# Patient Record
Sex: FEMALE | Race: WHITE | NOT HISPANIC OR LATINO | Employment: OTHER | URBAN - METROPOLITAN AREA
[De-identification: names, ages, dates, MRNs, and addresses within clinical notes are randomized per-mention and may not be internally consistent; named-entity substitution may affect disease eponyms.]

---

## 2018-05-01 ENCOUNTER — HOSPITAL ENCOUNTER (OUTPATIENT)
Facility: OTHER | Age: 28
LOS: 1 days | Discharge: HOME | End: 2018-05-02
Attending: EMERGENCY MEDICINE | Admitting: EMERGENCY MEDICINE
Payer: COMMERCIAL

## 2018-05-01 DIAGNOSIS — K35.80 ACUTE APPENDICITIS: ICD-10-CM

## 2018-05-01 LAB
ALBUMIN SERPL BCP-MCNC: 4.1 G/DL
ALP SERPL-CCNC: 36 U/L
ALT SERPL W/O P-5'-P-CCNC: 15 U/L
AMPHET+METHAMPHET UR QL: NEGATIVE
ANION GAP SERPL CALC-SCNC: 12 MMOL/L
AST SERPL-CCNC: 15 U/L
B-HCG UR QL: NEGATIVE
BARBITURATES UR QL SCN>200 NG/ML: NEGATIVE
BASOPHILS # BLD AUTO: 0.02 K/UL
BASOPHILS NFR BLD: 0.1 %
BENZODIAZ UR QL SCN>200 NG/ML: NEGATIVE
BILIRUB SERPL-MCNC: 0.7 MG/DL
BILIRUB UR QL STRIP: NEGATIVE
BUN SERPL-MCNC: 12 MG/DL
BZE UR QL SCN: NEGATIVE
CALCIUM SERPL-MCNC: 9.4 MG/DL
CANNABINOIDS UR QL SCN: NORMAL
CHLORIDE SERPL-SCNC: 104 MMOL/L
CLARITY UR: CLEAR
CO2 SERPL-SCNC: 22 MMOL/L
COLOR UR: YELLOW
CREAT SERPL-MCNC: 0.8 MG/DL
CREAT UR-MCNC: 104.2 MG/DL
CRP SERPL-MCNC: 12.2 MG/L
CTP QC/QA: YES
DIFFERENTIAL METHOD: ABNORMAL
EOSINOPHIL # BLD AUTO: 0 K/UL
EOSINOPHIL NFR BLD: 0.2 %
ERYTHROCYTE [DISTWIDTH] IN BLOOD BY AUTOMATED COUNT: 12.1 %
EST. GFR  (AFRICAN AMERICAN): >60 ML/MIN/1.73 M^2
EST. GFR  (NON AFRICAN AMERICAN): >60 ML/MIN/1.73 M^2
GLUCOSE SERPL-MCNC: 105 MG/DL
GLUCOSE UR QL STRIP: NEGATIVE
HCT VFR BLD AUTO: 40.7 %
HGB BLD-MCNC: 14.1 G/DL
HGB UR QL STRIP: NEGATIVE
INR PPP: 0.9
KETONES UR QL STRIP: ABNORMAL
LEUKOCYTE ESTERASE UR QL STRIP: NEGATIVE
LIPASE SERPL-CCNC: 17 U/L
LYMPHOCYTES # BLD AUTO: 1.5 K/UL
LYMPHOCYTES NFR BLD: 8.6 %
MCH RBC QN AUTO: 31.8 PG
MCHC RBC AUTO-ENTMCNC: 34.6 G/DL
MCV RBC AUTO: 92 FL
METHADONE UR QL SCN>300 NG/ML: NEGATIVE
MONOCYTES # BLD AUTO: 1.3 K/UL
MONOCYTES NFR BLD: 7.8 %
NEUTROPHILS # BLD AUTO: 14 K/UL
NEUTROPHILS NFR BLD: 83.1 %
NITRITE UR QL STRIP: NEGATIVE
OPIATES UR QL SCN: NEGATIVE
PCP UR QL SCN>25 NG/ML: NEGATIVE
PH UR STRIP: 5 [PH] (ref 5–8)
PLATELET # BLD AUTO: 206 K/UL
PMV BLD AUTO: 9.5 FL
POTASSIUM SERPL-SCNC: 3.5 MMOL/L
PROT SERPL-MCNC: 6.7 G/DL
PROT UR QL STRIP: NEGATIVE
PROTHROMBIN TIME: 10.3 SEC
RBC # BLD AUTO: 4.44 M/UL
SODIUM SERPL-SCNC: 138 MMOL/L
SP GR UR STRIP: 1.02 (ref 1–1.03)
TOXICOLOGY INFORMATION: NORMAL
URN SPEC COLLECT METH UR: ABNORMAL
UROBILINOGEN UR STRIP-ACNC: NEGATIVE EU/DL
WBC # BLD AUTO: 16.91 K/UL

## 2018-05-01 PROCEDURE — 25000003 PHARM REV CODE 250: Performed by: EMERGENCY MEDICINE

## 2018-05-01 PROCEDURE — 96361 HYDRATE IV INFUSION ADD-ON: CPT

## 2018-05-01 PROCEDURE — 96374 THER/PROPH/DIAG INJ IV PUSH: CPT

## 2018-05-01 PROCEDURE — 81025 URINE PREGNANCY TEST: CPT | Performed by: EMERGENCY MEDICINE

## 2018-05-01 PROCEDURE — 85025 COMPLETE CBC W/AUTO DIFF WBC: CPT

## 2018-05-01 PROCEDURE — 80307 DRUG TEST PRSMV CHEM ANLYZR: CPT

## 2018-05-01 PROCEDURE — 81003 URINALYSIS AUTO W/O SCOPE: CPT

## 2018-05-01 PROCEDURE — 85610 PROTHROMBIN TIME: CPT

## 2018-05-01 PROCEDURE — 80053 COMPREHEN METABOLIC PANEL: CPT

## 2018-05-01 PROCEDURE — 63600175 PHARM REV CODE 636 W HCPCS: Performed by: EMERGENCY MEDICINE

## 2018-05-01 PROCEDURE — 99285 EMERGENCY DEPT VISIT HI MDM: CPT | Mod: 25

## 2018-05-01 PROCEDURE — 86140 C-REACTIVE PROTEIN: CPT

## 2018-05-01 PROCEDURE — 83690 ASSAY OF LIPASE: CPT

## 2018-05-01 PROCEDURE — 96375 TX/PRO/DX INJ NEW DRUG ADDON: CPT

## 2018-05-01 PROCEDURE — 85651 RBC SED RATE NONAUTOMATED: CPT

## 2018-05-01 PROCEDURE — 86850 RBC ANTIBODY SCREEN: CPT

## 2018-05-01 RX ORDER — ONDANSETRON 2 MG/ML
4 INJECTION INTRAMUSCULAR; INTRAVENOUS
Status: COMPLETED | OUTPATIENT
Start: 2018-05-01 | End: 2018-05-01

## 2018-05-01 RX ORDER — MORPHINE SULFATE 2 MG/ML
6 INJECTION, SOLUTION INTRAMUSCULAR; INTRAVENOUS
Status: COMPLETED | OUTPATIENT
Start: 2018-05-01 | End: 2018-05-01

## 2018-05-01 RX ADMIN — MORPHINE SULFATE 6 MG: 2 INJECTION, SOLUTION INTRAMUSCULAR; INTRAVENOUS at 11:05

## 2018-05-01 RX ADMIN — ONDANSETRON 4 MG: 2 INJECTION INTRAMUSCULAR; INTRAVENOUS at 11:05

## 2018-05-01 RX ADMIN — SODIUM CHLORIDE 1000 ML: 0.9 INJECTION, SOLUTION INTRAVENOUS at 11:05

## 2018-05-02 ENCOUNTER — ANESTHESIA (OUTPATIENT)
Dept: SURGERY | Facility: OTHER | Age: 28
End: 2018-05-02
Payer: COMMERCIAL

## 2018-05-02 ENCOUNTER — ANESTHESIA EVENT (OUTPATIENT)
Dept: SURGERY | Facility: OTHER | Age: 28
End: 2018-05-02
Payer: COMMERCIAL

## 2018-05-02 VITALS
HEIGHT: 65 IN | HEART RATE: 58 BPM | OXYGEN SATURATION: 98 % | TEMPERATURE: 99 F | BODY MASS INDEX: 24.53 KG/M2 | WEIGHT: 147.25 LBS | DIASTOLIC BLOOD PRESSURE: 76 MMHG | RESPIRATION RATE: 16 BRPM | SYSTOLIC BLOOD PRESSURE: 139 MMHG

## 2018-05-02 PROBLEM — K35.80 ACUTE APPENDICITIS: Status: ACTIVE | Noted: 2018-05-02

## 2018-05-02 LAB
ABO + RH BLD: NORMAL
BLD GP AB SCN CELLS X3 SERPL QL: NORMAL
ERYTHROCYTE [SEDIMENTATION RATE] IN BLOOD BY WESTERGREN METHOD: 0 MM/HR

## 2018-05-02 PROCEDURE — 25500020 PHARM REV CODE 255: Performed by: EMERGENCY MEDICINE

## 2018-05-02 PROCEDURE — 63600175 PHARM REV CODE 636 W HCPCS: Performed by: SPECIALIST

## 2018-05-02 PROCEDURE — 25000003 PHARM REV CODE 250: Performed by: SPECIALIST

## 2018-05-02 PROCEDURE — 25000003 PHARM REV CODE 250: Performed by: EMERGENCY MEDICINE

## 2018-05-02 PROCEDURE — 63600175 PHARM REV CODE 636 W HCPCS: Performed by: NURSE ANESTHETIST, CERTIFIED REGISTERED

## 2018-05-02 PROCEDURE — 87070 CULTURE OTHR SPECIMN AEROBIC: CPT

## 2018-05-02 PROCEDURE — 36000708 HC OR TIME LEV III 1ST 15 MIN: Performed by: SPECIALIST

## 2018-05-02 PROCEDURE — 94761 N-INVAS EAR/PLS OXIMETRY MLT: CPT

## 2018-05-02 PROCEDURE — 88304 TISSUE EXAM BY PATHOLOGIST: CPT | Performed by: PATHOLOGY

## 2018-05-02 PROCEDURE — 37000008 HC ANESTHESIA 1ST 15 MINUTES: Performed by: SPECIALIST

## 2018-05-02 PROCEDURE — G0378 HOSPITAL OBSERVATION PER HR: HCPCS

## 2018-05-02 PROCEDURE — 63600175 PHARM REV CODE 636 W HCPCS: Performed by: EMERGENCY MEDICINE

## 2018-05-02 PROCEDURE — 88304 TISSUE EXAM BY PATHOLOGIST: CPT | Mod: 26,,, | Performed by: PATHOLOGY

## 2018-05-02 PROCEDURE — 87075 CULTR BACTERIA EXCEPT BLOOD: CPT

## 2018-05-02 PROCEDURE — 25000003 PHARM REV CODE 250: Performed by: NURSE ANESTHETIST, CERTIFIED REGISTERED

## 2018-05-02 PROCEDURE — 71000033 HC RECOVERY, INTIAL HOUR: Performed by: SPECIALIST

## 2018-05-02 PROCEDURE — 36000709 HC OR TIME LEV III EA ADD 15 MIN: Performed by: SPECIALIST

## 2018-05-02 PROCEDURE — 37000009 HC ANESTHESIA EA ADD 15 MINS: Performed by: SPECIALIST

## 2018-05-02 PROCEDURE — 27201423 OPTIME MED/SURG SUP & DEVICES STERILE SUPPLY: Performed by: SPECIALIST

## 2018-05-02 RX ORDER — SODIUM CHLORIDE 9 MG/ML
INJECTION, SOLUTION INTRAVENOUS CONTINUOUS
Status: DISCONTINUED | OUTPATIENT
Start: 2018-05-02 | End: 2018-05-02 | Stop reason: HOSPADM

## 2018-05-02 RX ORDER — FENTANYL CITRATE 50 UG/ML
25 INJECTION, SOLUTION INTRAMUSCULAR; INTRAVENOUS EVERY 5 MIN PRN
Status: COMPLETED | OUTPATIENT
Start: 2018-05-02 | End: 2018-05-02

## 2018-05-02 RX ORDER — SUCCINYLCHOLINE CHLORIDE 20 MG/ML
INJECTION INTRAMUSCULAR; INTRAVENOUS
Status: DISCONTINUED | OUTPATIENT
Start: 2018-05-02 | End: 2018-05-02

## 2018-05-02 RX ORDER — SODIUM CHLORIDE 0.9 % (FLUSH) 0.9 %
3 SYRINGE (ML) INJECTION
Status: DISCONTINUED | OUTPATIENT
Start: 2018-05-02 | End: 2018-05-02 | Stop reason: HOSPADM

## 2018-05-02 RX ORDER — MEPERIDINE HYDROCHLORIDE 50 MG/ML
12.5 INJECTION INTRAMUSCULAR; INTRAVENOUS; SUBCUTANEOUS ONCE AS NEEDED
Status: DISCONTINUED | OUTPATIENT
Start: 2018-05-02 | End: 2018-05-02 | Stop reason: HOSPADM

## 2018-05-02 RX ORDER — CEFOXITIN SODIUM 2 G/50ML
2 INJECTION, SOLUTION INTRAVENOUS
Status: COMPLETED | OUTPATIENT
Start: 2018-05-02 | End: 2018-05-02

## 2018-05-02 RX ORDER — ACETAMINOPHEN 10 MG/ML
INJECTION, SOLUTION INTRAVENOUS
Status: DISCONTINUED | OUTPATIENT
Start: 2018-05-02 | End: 2018-05-02

## 2018-05-02 RX ORDER — OXYCODONE HYDROCHLORIDE 5 MG/1
5 TABLET ORAL
Status: DISCONTINUED | OUTPATIENT
Start: 2018-05-02 | End: 2018-05-02 | Stop reason: HOSPADM

## 2018-05-02 RX ORDER — HYDROMORPHONE HYDROCHLORIDE 2 MG/ML
INJECTION, SOLUTION INTRAMUSCULAR; INTRAVENOUS; SUBCUTANEOUS
Status: DISCONTINUED | OUTPATIENT
Start: 2018-05-02 | End: 2018-05-02

## 2018-05-02 RX ORDER — ONDANSETRON 2 MG/ML
4 INJECTION INTRAMUSCULAR; INTRAVENOUS EVERY 12 HOURS PRN
Status: DISCONTINUED | OUTPATIENT
Start: 2018-05-02 | End: 2018-05-02 | Stop reason: HOSPADM

## 2018-05-02 RX ORDER — ROCURONIUM BROMIDE 10 MG/ML
INJECTION, SOLUTION INTRAVENOUS
Status: DISCONTINUED | OUTPATIENT
Start: 2018-05-02 | End: 2018-05-02

## 2018-05-02 RX ORDER — FENTANYL CITRATE 50 UG/ML
INJECTION, SOLUTION INTRAMUSCULAR; INTRAVENOUS
Status: DISCONTINUED | OUTPATIENT
Start: 2018-05-02 | End: 2018-05-02

## 2018-05-02 RX ORDER — LEVONORGESTREL AND ETHINYL ESTRADIOL 0.15-0.03
1 KIT ORAL DAILY
COMMUNITY
End: 2019-05-08 | Stop reason: SDUPTHER

## 2018-05-02 RX ORDER — GLYCOPYRROLATE 0.2 MG/ML
INJECTION INTRAMUSCULAR; INTRAVENOUS
Status: DISCONTINUED | OUTPATIENT
Start: 2018-05-02 | End: 2018-05-02

## 2018-05-02 RX ORDER — DIPHENHYDRAMINE HYDROCHLORIDE 50 MG/ML
25 INJECTION INTRAMUSCULAR; INTRAVENOUS EVERY 6 HOURS PRN
Status: DISCONTINUED | OUTPATIENT
Start: 2018-05-02 | End: 2018-05-02 | Stop reason: HOSPADM

## 2018-05-02 RX ORDER — HYDROCODONE BITARTRATE AND ACETAMINOPHEN 5; 325 MG/1; MG/1
1 TABLET ORAL EVERY 4 HOURS PRN
Status: DISCONTINUED | OUTPATIENT
Start: 2018-05-02 | End: 2018-05-02 | Stop reason: HOSPADM

## 2018-05-02 RX ORDER — PROPOFOL 10 MG/ML
VIAL (ML) INTRAVENOUS
Status: DISCONTINUED | OUTPATIENT
Start: 2018-05-02 | End: 2018-05-02

## 2018-05-02 RX ORDER — ONDANSETRON 2 MG/ML
4 INJECTION INTRAMUSCULAR; INTRAVENOUS EVERY 8 HOURS PRN
Status: DISCONTINUED | OUTPATIENT
Start: 2018-05-02 | End: 2018-05-02 | Stop reason: SDUPTHER

## 2018-05-02 RX ORDER — SODIUM CHLORIDE, SODIUM LACTATE, POTASSIUM CHLORIDE, CALCIUM CHLORIDE 600; 310; 30; 20 MG/100ML; MG/100ML; MG/100ML; MG/100ML
INJECTION, SOLUTION INTRAVENOUS CONTINUOUS PRN
Status: DISCONTINUED | OUTPATIENT
Start: 2018-05-02 | End: 2018-05-02

## 2018-05-02 RX ORDER — NEOSTIGMINE METHYLSULFATE 1 MG/ML
INJECTION, SOLUTION INTRAVENOUS
Status: DISCONTINUED | OUTPATIENT
Start: 2018-05-02 | End: 2018-05-02

## 2018-05-02 RX ORDER — DEXTROSE MONOHYDRATE, SODIUM CHLORIDE, AND POTASSIUM CHLORIDE 50; 1.49; 4.5 G/1000ML; G/1000ML; G/1000ML
INJECTION, SOLUTION INTRAVENOUS CONTINUOUS
Status: DISCONTINUED | OUTPATIENT
Start: 2018-05-02 | End: 2018-05-02

## 2018-05-02 RX ORDER — MORPHINE SULFATE 4 MG/ML
4 INJECTION, SOLUTION INTRAMUSCULAR; INTRAVENOUS EVERY 4 HOURS PRN
Status: DISCONTINUED | OUTPATIENT
Start: 2018-05-02 | End: 2018-05-02 | Stop reason: HOSPADM

## 2018-05-02 RX ORDER — HYDROCODONE BITARTRATE AND ACETAMINOPHEN 5; 325 MG/1; MG/1
TABLET ORAL
Qty: 20 TABLET | Refills: 0 | Status: SHIPPED | OUTPATIENT
Start: 2018-05-02 | End: 2019-02-25

## 2018-05-02 RX ORDER — SPIRONOLACTONE 50 MG/1
50 TABLET, FILM COATED ORAL DAILY
COMMUNITY
End: 2022-12-08

## 2018-05-02 RX ORDER — HYDROCODONE BITARTRATE AND ACETAMINOPHEN 10; 325 MG/1; MG/1
1 TABLET ORAL EVERY 4 HOURS PRN
Status: DISCONTINUED | OUTPATIENT
Start: 2018-05-02 | End: 2018-05-02 | Stop reason: HOSPADM

## 2018-05-02 RX ORDER — ONDANSETRON 2 MG/ML
4 INJECTION INTRAMUSCULAR; INTRAVENOUS DAILY PRN
Status: DISCONTINUED | OUTPATIENT
Start: 2018-05-02 | End: 2018-05-02 | Stop reason: HOSPADM

## 2018-05-02 RX ORDER — LIDOCAINE HCL/PF 100 MG/5ML
SYRINGE (ML) INTRAVENOUS
Status: DISCONTINUED | OUTPATIENT
Start: 2018-05-02 | End: 2018-05-02

## 2018-05-02 RX ORDER — HYDROMORPHONE HYDROCHLORIDE 2 MG/ML
0.4 INJECTION, SOLUTION INTRAMUSCULAR; INTRAVENOUS; SUBCUTANEOUS EVERY 5 MIN PRN
Status: DISCONTINUED | OUTPATIENT
Start: 2018-05-02 | End: 2018-05-02 | Stop reason: HOSPADM

## 2018-05-02 RX ADMIN — FENTANYL CITRATE 25 MCG: 50 INJECTION, SOLUTION INTRAMUSCULAR; INTRAVENOUS at 10:05

## 2018-05-02 RX ADMIN — GLYCOPYRROLATE 0.4 MG: 0.2 INJECTION, SOLUTION INTRAMUSCULAR; INTRAVENOUS at 09:05

## 2018-05-02 RX ADMIN — DEXTROSE MONOHYDRATE, SODIUM CHLORIDE, AND POTASSIUM CHLORIDE: 50; 4.5; 1.49 INJECTION, SOLUTION INTRAVENOUS at 02:05

## 2018-05-02 RX ADMIN — NEOSTIGMINE METHYLSULFATE 2 MG: 1 INJECTION INTRAVENOUS at 09:05

## 2018-05-02 RX ADMIN — LIDOCAINE HYDROCHLORIDE 75 MG: 20 INJECTION, SOLUTION INTRAVENOUS at 08:05

## 2018-05-02 RX ADMIN — FENTANYL CITRATE 50 MCG: 50 INJECTION, SOLUTION INTRAMUSCULAR; INTRAVENOUS at 09:05

## 2018-05-02 RX ADMIN — PROPOFOL 200 MG: 10 INJECTION, EMULSION INTRAVENOUS at 08:05

## 2018-05-02 RX ADMIN — CARBOXYMETHYLCELLULOSE SODIUM 2 DROP: 2.5 SOLUTION/ DROPS OPHTHALMIC at 08:05

## 2018-05-02 RX ADMIN — ONDANSETRON HYDROCHLORIDE 4 MG: 2 INJECTION, SOLUTION INTRAMUSCULAR; INTRAVENOUS at 11:05

## 2018-05-02 RX ADMIN — SODIUM CHLORIDE: 0.9 INJECTION, SOLUTION INTRAVENOUS at 11:05

## 2018-05-02 RX ADMIN — FENTANYL CITRATE 100 MCG: 50 INJECTION, SOLUTION INTRAMUSCULAR; INTRAVENOUS at 09:05

## 2018-05-02 RX ADMIN — HYDROCODONE BITARTRATE AND ACETAMINOPHEN 1 TABLET: 5; 325 TABLET ORAL at 01:05

## 2018-05-02 RX ADMIN — MORPHINE SULFATE 4 MG: 4 INJECTION, SOLUTION INTRAMUSCULAR; INTRAVENOUS at 03:05

## 2018-05-02 RX ADMIN — HYDROMORPHONE HYDROCHLORIDE 1 MG: 2 INJECTION INTRAMUSCULAR; INTRAVENOUS; SUBCUTANEOUS at 09:05

## 2018-05-02 RX ADMIN — SODIUM CHLORIDE, SODIUM LACTATE, POTASSIUM CHLORIDE, AND CALCIUM CHLORIDE: 600; 310; 30; 20 INJECTION, SOLUTION INTRAVENOUS at 08:05

## 2018-05-02 RX ADMIN — ROCURONIUM BROMIDE 5 MG: 10 INJECTION, SOLUTION INTRAVENOUS at 08:05

## 2018-05-02 RX ADMIN — IOHEXOL 75 ML: 350 INJECTION, SOLUTION INTRAVENOUS at 12:05

## 2018-05-02 RX ADMIN — SUCCINYLCHOLINE CHLORIDE 120 MG: 20 INJECTION, SOLUTION INTRAMUSCULAR; INTRAVENOUS at 08:05

## 2018-05-02 RX ADMIN — FENTANYL CITRATE 100 MCG: 50 INJECTION, SOLUTION INTRAMUSCULAR; INTRAVENOUS at 08:05

## 2018-05-02 RX ADMIN — CEFOXITIN SODIUM 2 G: 2 INJECTION, SOLUTION INTRAVENOUS at 01:05

## 2018-05-02 RX ADMIN — ACETAMINOPHEN 1000 MG: 10 INJECTION, SOLUTION INTRAVENOUS at 09:05

## 2018-05-02 NOTE — BRIEF OP NOTE
[facility]  Brief Operative Note     SUMMARY     Surgery Date: 5/2/2018     Surgeon(s) and Role:     * Tee Spencer MD - Primary    Assisting Surgeon: None    Pre-op Diagnosis:  Appendicitis [K37]    Post-op Diagnosis:  Post-Op Diagnosis Codes:     * Appendicitis [K37]    Procedure(s) (LRB):  APPENDECTOMY-LAPAROSCOPIC (N/A)    Anesthesia: General    Description of the findings of the procedure: Acute appy    Findings/Key Components: Exudate sent for C&S    Estimated Blood Loss: * No values recorded between 5/2/2018  9:01 AM and 5/2/2018  9:37 AM *min         Specimens:   Specimen (12h ago through future)    Start     Ordered    05/02/18 0911  Specimen to Pathology - Surgery  Once     Comments:  1. Appendix      05/02/18 0929          Discharge Note    SUMMARY     Admit Date: 5/1/2018    Discharge Date and Time:  05/02/2018 9:37 AM    Hospital Course (synopsis of major diagnoses, care, treatment, and services provided during the course of the hospital stay): treated     Final Diagnosis: Post-Op Diagnosis Codes:     * Appendicitis [K37]    Disposition: Home or Self Care    Follow Up/Patient Instructions:     Medications:  Reconciled Home Medications:      Medication List      START taking these medications    hydrocodone-acetaminophen 5-325mg 5-325 mg per tablet  Commonly known as:  NORCO  Take 1 or 2 tabs every 4 hours as needed.        CONTINUE taking these medications    levonorgestrel-ethinyl estradiol 0.15 mg-30 mcg per tablet  Commonly known as:  SEASONALE  Take 1 tablet by mouth once daily.     rifAXImin 200 mg Tab  Commonly known as:  XIFAXAN  Take 550 mg by mouth 3 (three) times daily.     spironolactone 50 MG tablet  Commonly known as:  ALDACTONE  Take 50 mg by mouth once daily.            Discharge Procedure Orders  Diet general     Activity as tolerated     Shower on day dressing removed (No bath)   Order Comments: You may shower on the 2nd day following your surgery.     Call  MD for:  redness, tenderness, or signs of infection (pain, swelling, redness, odor or green/yellow discharge around incision site)     Remove dressing in 48 hours       Follow-up Information     Tee Spencer MD In 2 weeks.    Specialty:  General Surgery  Contact information:  [address]  [facility] [id number] [phone]

## 2018-05-02 NOTE — NURSING
0745 Pt alert vs stable. Pain is tolerable at this time. Abdomen soft. No nausea at this time. Resp clear and unlabored. IVF infusing. NPO p MN noted. Pre Op report called to Christine in holding. Pre Op teaching done. Pt ready for surgery.

## 2018-05-02 NOTE — NURSING
11:30 Pt returned from surgery . Incision sites to abdomen x 3 c steri strips and bandaids intact c no bleeding or drainage. Vs stable. Sleepy but arouses easily. IVF infusing. Pt remains on room air c stable O2 Sat. Diet clear liquid ordered.  C/o of nausea and zofran 4mg given IVP. Family at bedside.

## 2018-05-02 NOTE — NURSING
1520 Pt ambulating in room. Incisions intact. Voided on commode. Pain relieved c po pain med. No nausea at present , tolerated clear liquids well. Pt states she feels ready for discharge. IV discontinued and prescriptions given to pt. Pt given verbal and written discharge instructions and verbalizes understanding of home meds and follow up care. Discharged home in stable condition.

## 2018-05-02 NOTE — ANESTHESIA PREPROCEDURE EVALUATION
05/02/2018  Prudence Castaneda is a 27 y.o., female.    Anesthesia Evaluation    I have reviewed the Patient Summary Reports.    I have reviewed the Nursing Notes.   I have reviewed the Medications.     Review of Systems  Anesthesia Hx:  No previous Anesthesia    Social:  Social Alcohol Use, Non-Smoker    Hematology/Oncology:  Hematology Normal   Oncology Normal     EENT/Dental:EENT/Dental Normal   Cardiovascular:  Cardiovascular Normal Exercise tolerance: good     Pulmonary:  Pulmonary Normal    Renal/:  Renal/ Normal     Hepatic/GI:   PUD, UC   Musculoskeletal:  Musculoskeletal Normal    Neurological:  Neurology Normal    Endocrine:  Endocrine Normal    Dermatological:  Skin Normal    Psych:  Psychiatric Normal           Physical Exam  General:  Well nourished    Airway/Jaw/Neck:  Airway Findings: Mouth Opening: Normal Tongue: Normal  General Airway Assessment: Adult, Good  Mallampati: I  TM Distance: Normal, at least 6 cm  Jaw/Neck Findings:  Neck ROM: Normal ROM      Dental:  Dental Findings: In tact, lower retainer        Mental Status:  Mental Status Findings:  Cooperative, Alert and Oriented         Anesthesia Plan  Type of Anesthesia, risks & benefits discussed:  Anesthesia Type:  general  Patient's Preference:   Intra-op Monitoring Plan: standard ASA monitors  Intra-op Monitoring Plan Comments:   Post Op Pain Control Plan: per primary service following discharge from PACU  Post Op Pain Control Plan Comments:   Induction:    Beta Blocker:         Informed Consent: Patient understands risks and agrees with Anesthesia plan.  Questions answered. Anesthesia consent signed with patient.  ASA Score: 2  emergent   Day of Surgery Review of History & Physical:    H&P update referred to the surgeon.     Anesthesia Plan Notes: RSI        Ready For Surgery From Anesthesia Perspective.

## 2018-05-02 NOTE — ANESTHESIA POSTPROCEDURE EVALUATION
"Anesthesia Post Evaluation    Patient: Prudence Castaneda    Procedure(s) Performed: Procedure(s) (LRB):  APPENDECTOMY-LAPAROSCOPIC (N/A)    Final Anesthesia Type: general  Patient location during evaluation: PACU  Patient participation: Yes- Able to Participate  Level of consciousness: awake and alert  Post-procedure vital signs: reviewed and stable  Pain management: adequate  Airway patency: patent  PONV status at discharge: No PONV  Anesthetic complications: no      Cardiovascular status: blood pressure returned to baseline  Respiratory status: unassisted, spontaneous ventilation and room air  Hydration status: euvolemic  Follow-up not needed.        Visit Vitals  BP (!) 142/80   Pulse 61   Temp 36.6 °C (97.9 °F) (Oral)   Resp 16   Ht 5' 5" (1.651 m)   Wt 66.8 kg (147 lb 4.3 oz)   LMP 02/13/2018 (Approximate)   SpO2 100%   Breastfeeding? No   BMI 24.51 kg/m²       Pain/Kami Score: Pain Assessment Performed: Yes (5/2/2018  9:52 AM)  Presence of Pain: denies (5/2/2018  9:52 AM)  Pain Rating Prior to Med Admin: 7 (5/2/2018 10:10 AM)  Pain Rating Post Med Admin: 0 (5/2/2018  4:00 AM)  Kami Score: 8 (5/2/2018 10:07 AM)      "

## 2018-05-02 NOTE — TRANSFER OF CARE
"Anesthesia Transfer of Care Note    Patient: Prudence Castaneda    Procedure(s) Performed: Procedure(s) (LRB):  APPENDECTOMY-LAPAROSCOPIC (N/A)    Patient location: PACU    Anesthesia Type: general    Transport from OR: Transported from OR on 2-3 L/min O2 by NC with adequate spontaneous ventilation    Post pain: adequate analgesia    Post assessment: no apparent anesthetic complications    Post vital signs: stable    Level of consciousness: awake    Nausea/Vomiting: no nausea/vomiting    Complications: none    Transfer of care protocol was followed      Last vitals:   Visit Vitals  BP (!) 140/87 (BP Location: Right arm, Patient Position: Lying)   Pulse 94   Temp 36.6 °C (97.9 °F) (Oral)   Resp 16   Ht 5' 5" (1.651 m)   Wt 66.8 kg (147 lb 4.3 oz)   LMP 02/13/2018 (Approximate)   SpO2 100%   Breastfeeding? No   BMI 24.51 kg/m²     "

## 2018-05-02 NOTE — PLAN OF CARE
LMSW met with patient at the bedside. Patients friend Tate is at the bedside.     Patient is alert and oriented with no communication barriers.     Patient does not have a PCP and prefers to use the [facility] pharmacy on [address]. SW offered to assist patient with getting established with a PCP. Patient agreed. SW arranged appointment see AVS.     Prior to admission patient was independent.     Patient denies a history of mental illness for herself. But states that her paternal side has a history of depression and bipolar disorder.     Patient drinks socially.     Tate will transport patient home pending discharge.     No CM needs for discharge.        05/02/18 1504   Discharge Assessment   Assessment Type Discharge Planning Assessment   Confirmed/corrected address and phone number on facesheet? Yes   Assessment information obtained from? Patient;Caregiver   Expected Length of Stay (days) 1   Communicated expected length of stay with patient/caregiver yes   Prior to hospitilization cognitive status: Alert/Oriented   Prior to hospitalization functional status: Independent   Current cognitive status: Alert/Oriented   Current Functional Status: Independent   Lives With other (see comments)   Able to Return to Prior Arrangements yes   Is patient able to care for self after discharge? Yes   Patient's perception of discharge disposition home or selfcare   Readmission Within The Last 30 Days no previous admission in last 30 days   Patient currently being followed by outpatient case management? No   Patient currently receives any other outside agency services? No   Equipment Currently Used at Home none   Do you have any problems affording any of your prescribed medications? No   Is the patient taking medications as prescribed? yes   Does the patient have transportation home? Yes   Does the patient receive services at the [facility]? No   Discharge Plan A Home   Patient/Family In Agreement With Plan yes

## 2018-05-02 NOTE — OP NOTE
DATE OF PROCEDURE:  05/02/2018.    PREOPERATIVE DIAGNOSIS:  Acute appendicitis.    POSTOPERATIVE DIAGNOSIS:  Acute suppurative appendicitis.    PROCEDURE:  Laparoscopic appendectomy.    PROCEDURE IN DETAILS:  The patient was taken to the Operating Room, placed on   the table in the supine position.  After smooth induction with general   anesthesia, the abdomen was prepped and draped in the usual sterile fashion.  A   subumbilical incision was made and the Veress needle placed.  The abdomen was   easily insufflated with CO2 to 12 mmHg.  A 5-mm Optiview was advanced and the   scope placed.  Inspection revealed no injury to underlying bowel.  A 5 mm   cannula was placed in the right abdomen and a 12-mm Optiview in the left lower   quadrant.  Manipulation in the right lower quadrant revealed an acute   suppurative appendicitis.  The appendix was adherent in the right pericolic   gutter.  The appendix was carefully freed up without difficulty.  The exudate on   the appendix was removed and sent to Pathology for cultures.  The base of the   appendix was then identified.  A window was made through the mesoappendix.  The   cecum was normal in appearance.  The base of the appendix was divided using the   Endo-OK stapler.  The mesoappendix was then freed up and divided using the   vascular Endo-OK stapler.  The appendix was placed in an Endobag and removed   through the 12 mm port site.  The area was then inspected.  There was no   evidence of bleeding.  The area was irrigated and the fluid aspirated.  The   right colon and the visualized portion of the transverse colon was normal in   appearance in this patient with a history of ulcerative colitis.  The 12 mm   cannula was then removed.  The fascia was reapproximated using 0 Vicryl.  The   5-mm cannulas were then removed.  The CO2 was allowed to escape.  The wounds   were closed using 4-0 Vicryl followed by glue and Steri-Strips.  Blood loss was   minimal.  The patient  tolerated the procedure and left the Operating Room in   stable condition.      JESSIE/CINTHYA  dd: 05/02/2018 09:41:29 (CDT)  td: 05/02/2018 14:14:20 (CDT)  Doc ID   #[id number]  Job ID #[id number]    CC:

## 2018-05-02 NOTE — ED PROVIDER NOTES
"Encounter Date: 5/1/2018    SCRIBE #1 NOTE: I, Chantal Veliz, am scribing for, and in the presence of, Dr. Pryor .       History     Chief Complaint   Patient presents with    Abdominal Pain     hx: IBS and UC. R upper and lower abd pain that has been intermittent all day. 1 hour ago.Pt reports vomiting x 1 episode and diarrhea x 1 episode. Denies dysuria.     Time seen by provider: 11:16 PM    This is a 27 y.o. Female who presents with complaint of abdominal pain that has been constant since this morning. The progressively worsening pain was initially diffuse, but is now localized to the RLQ. This pain currently rates 6/10, and is inconsistent with prior exacerbations secondary to UC. She denies previously experiencing this pain. The patient found it difficult to stand once pain became severe three hours ago. She denies taking OTC medication, but is compliant with medications for UC. She reports nausea, one episode of emesis that occurred while waiting in the ED, and three episodes of diarrhea. The patient believes she visualized " a bit" of blood in stool after the last episode of diarrhea. She has not attempted to consume liquids since her last episode of emesis. She denies fever, chills, blood in vomitus, myalgias, back pain, or any urinary symptoms. She has no additional complaints.     Patient takes OCPs; she denies history of ovarian cysts or fibroids. She admits to consuming "a lot of alcohol over the weekend."       The history is provided by the patient and a significant other.     Review of patient's allergies indicates:  No Known Allergies  Past Medical History:   Diagnosis Date    Ulcerative colitis      History reviewed. No pertinent surgical history.  History reviewed. No pertinent family history.  Social History   Substance Use Topics    Smoking status: Never Smoker    Smokeless tobacco: Not on file    Alcohol use Yes     Review of Systems   Constitutional: Negative for activity change, " appetite change, chills, diaphoresis and fever.   HENT: Negative for congestion, sore throat and trouble swallowing.    Respiratory: Negative for cough, chest tightness and shortness of breath.    Cardiovascular: Negative for chest pain.   Gastrointestinal: Positive for abdominal pain, blood in stool, diarrhea, nausea and vomiting.        Negative for blood in vomitus.   Endocrine: Negative for polydipsia and polyuria.   Genitourinary: Negative for difficulty urinating, dysuria, flank pain, frequency, hematuria and urgency.   Musculoskeletal: Negative for back pain, myalgias and neck pain.   Skin: Negative for rash.   Allergic/Immunologic: Negative for immunocompromised state.   Neurological: Negative for weakness and headaches.   Psychiatric/Behavioral: Negative for confusion.       Physical Exam     Initial Vitals [05/01/18 2145]   BP Pulse Resp Temp SpO2   (!) 121/59 70 20 98.3 °F (36.8 °C) 100 %      MAP       79.67         Physical Exam    Nursing note and vitals reviewed.  Constitutional: She appears well-developed and well-nourished. She is not diaphoretic. No distress.   HENT:   Head: Normocephalic and atraumatic.   Right Ear: External ear normal.   Left Ear: External ear normal.   Eyes: Conjunctivae and EOM are normal. Right eye exhibits no discharge. Left eye exhibits no discharge.   Neck: Normal range of motion. Neck supple. No tracheal deviation present.   Cardiovascular: Normal rate, regular rhythm, normal heart sounds and intact distal pulses. Exam reveals no gallop and no friction rub.    No murmur heard.  Pulmonary/Chest: Breath sounds normal. No stridor. No respiratory distress. She has no wheezes. She has no rhonchi. She has no rales.   Abdominal: Soft. She exhibits no distension. There is tenderness. There is no rebound and no guarding.   Decreased bowel sounds. Tenderness with palpation to the suprapubic region and RLQ. Negative obturator sign. Positive psoas sign.    Musculoskeletal: Normal  range of motion. She exhibits no edema or tenderness.   Neurological: She is alert and oriented to person, place, and time. She has normal strength. No cranial nerve deficit.   Skin: Skin is warm and dry. Capillary refill takes less than 2 seconds. No rash noted. No erythema. No pallor.   Psychiatric: She has a normal mood and affect. Her behavior is normal. Judgment and thought content normal.         ED Course   Procedures  Labs Reviewed   C-REACTIVE PROTEIN - Abnormal; Notable for the following:        Result Value    CRP 12.2 (*)     All other components within normal limits   CBC W/ AUTO DIFFERENTIAL - Abnormal; Notable for the following:     WBC 16.91 (*)     MCH 31.8 (*)     Gran # (ANC) 14.0 (*)     Mono # 1.3 (*)     Gran% 83.1 (*)     Lymph% 8.6 (*)     All other components within normal limits   COMPREHENSIVE METABOLIC PANEL - Abnormal; Notable for the following:     CO2 22 (*)     Alkaline Phosphatase 36 (*)     All other components within normal limits   URINALYSIS - Abnormal; Notable for the following:     Ketones, UA Trace (*)     All other components within normal limits   SEDIMENTATION RATE, MANUAL   LIPASE   DRUG SCREEN PANEL, URINE EMERGENCY   PROTIME-INR   POCT URINE PREGNANCY   TYPE & SCREEN     Imaging Results          CT Abdomen Pelvis With Contrast (Final result)     Abnormal  Result time 05/02/18 00:40:09    Final result by Bowen Livingston MD (05/02/18 00:40:09)                 Impression:      Blind-ending tubular structure in the right lower abdominal quadrant emanating from the cecum with associated inflammatory changes and diameter of 10 mm, most consistent with acute appendicitis.    Free fluid in the pelvis with extension into the right upper quadrant with no organized collection to suggest an abscess.    Wall thickening involving the cecum and proximal ascending colon most represents reactive colitis from the inflammatory changes in from the acute appendicitis.    This report was flagged  in Epic as abnormal.      Electronically signed by: Bowen Livingston MD  Date:    05/02/2018  Time:    00:40             Narrative:    EXAMINATION:  CT ABDOMEN PELVIS WITH CONTRAST    CLINICAL HISTORY:  RLQ pain, appendicitis suspected;    TECHNIQUE:  Low dose axial images, sagittal and coronal reformations were obtained from the lung bases to the pubic symphysis following the IV administration of 75 mL of Omnipaque 350 .  Oral contrast was not given.    COMPARISON:  None.    FINDINGS:  There are no pleural effusions.  There is no evidence of a pneumothorax.  No airspace opacity is identified.    The heart is unremarkable.  The abdominal aorta and its branch vessels are within normal limits.  The portal vein and mesenteric vessels are unremarkable.  The IVC and venous structures are unremarkable.  There are extensive lymph nodes in the right lower abdominal quadrant.    The esophagus is within normal limits.  The stomach and duodenum are within normal limits.  The small bowel loops are unremarkable.  There is a blind-ending tubular structure in the right lower quadrant measuring up to 10 mm in diameter with associated inflammatory changes, representing the inflamed appendix.  There are reactive changes in the cecum and proximal ascending colon.    There is nonspecific periportal edema.  The liver is grossly intact.  The gallbladder is within normal limits.  The biliary tree is unremarkable.  The spleen is unremarkable.  The pancreas is within normal limits.  The adrenal glands are unremarkable.    The kidneys are unremarkable.  There is mild prominence of the bilateral ureters.  The urinary bladder is distended.  The uterus and adnexal structures are within normal limits.    There is free fluid in the pelvis.  There is also extension of free fluid in the right upper quadrant.  There is no evidence of free air.  There is no evidence of pneumatosis.    The psoas margins are unremarkable.  The abdominal wall is within  normal limits.  There is a bone island in the right femoral head.  The osseous structures are otherwise unremarkable.                                        Medical Decision Making:   Initial Assessment:   Patient presents with right lower quadrant abdominal pain and tenderness.  Her exam is concerning for appendicitis.  She has no peritoneal signs at this time.  I will send labs and obtain a CT of the abdomen.  I will give parenteral analgesia for relief.  I will reassess.  Clinical Tests:   Lab Tests: Ordered and Reviewed  Radiological Study: Ordered and Reviewed  ED Management:  12:50 AM Patient's CT is consistent with acute non-ruptured appendicitis. Case discussed with Dr. Spencer of general surgery, who has accepted this admission for further management.  Patient remains hemodynamically stable and is in agreement with this plan.            Scribe Attestation:   Scribe #1: I performed the above scribed service and the documentation accurately describes the services I performed. I attest to the accuracy of the note.    Attending Attestation:           Physician Attestation for Scribe:  Physician Attestation Statement for Scribe #1: I, Dr. Pryor , reviewed documentation, as scribed by Chantal Veliz in my presence, and it is both accurate and complete.                    Clinical Impression:     1. Acute appendicitis                                 Lynsey Pryor MD  05/02/18 0119

## 2018-05-04 ENCOUNTER — NURSE TRIAGE (OUTPATIENT)
Dept: ADMINISTRATIVE | Facility: CLINIC | Age: 28
End: 2018-05-04

## 2018-05-04 NOTE — TELEPHONE ENCOUNTER
Reason for Disposition   Health Information question, no triage required and triager able to answer question    Protocols used: ST INFORMATION ONLY CALL-A-AH    Pt states she is s/p appendectomy and has a tiny amount of blood from one wound. Pt wondering if she can put dressing on it. Advised pt that would be fine to put dressing. Care advice given.

## 2018-05-05 LAB — BACTERIA SPEC AEROBE CULT: NO GROWTH

## 2018-05-09 LAB — BACTERIA SPEC ANAEROBE CULT: NORMAL

## 2018-05-11 ENCOUNTER — NURSE TRIAGE (OUTPATIENT)
Dept: ADMINISTRATIVE | Facility: CLINIC | Age: 28
End: 2018-05-11

## 2018-05-11 NOTE — TELEPHONE ENCOUNTER
Reason for Disposition   Other post-op symptom or question (all triage questions negative)    Protocols used: ST POST-OP SYMPTOMS AND [id number]    Pt calling with concerns of having a hard lump to surgical site. No redness or infection noted. Care advice given.

## 2019-01-24 ENCOUNTER — TELEPHONE (OUTPATIENT)
Dept: OBSTETRICS AND GYNECOLOGY | Facility: CLINIC | Age: 29
End: 2019-01-24

## 2019-01-24 NOTE — TELEPHONE ENCOUNTER
----- Message from Niki Dunbar sent at 1/24/2019 10:03 AM CST -----  Contact: Pt   Name of Who is Calling:   ISAIAH HERRMANN [[id number]]    What is the request in detail:Pt is returning She[facility]ita Call. Please Advise    Can the clinic reply by [facility]: N    What Number to Call Back if not in [phone]

## 2019-01-24 NOTE — TELEPHONE ENCOUNTER
----- Message from Linda Curiel MA sent at 1/23/2019  2:00 PM CST -----  PLEASE CALL PT SHE WILL LIKE TO SCHEDULE A NEW PT APPT [phone]   [phone]

## 2019-02-25 ENCOUNTER — OFFICE VISIT (OUTPATIENT)
Dept: OBSTETRICS AND GYNECOLOGY | Facility: CLINIC | Age: 29
End: 2019-02-25
Attending: OBSTETRICS & GYNECOLOGY
Payer: COMMERCIAL

## 2019-02-25 VITALS
HEIGHT: 65 IN | DIASTOLIC BLOOD PRESSURE: 72 MMHG | SYSTOLIC BLOOD PRESSURE: 108 MMHG | BODY MASS INDEX: 22.11 KG/M2 | WEIGHT: 132.69 LBS

## 2019-02-25 DIAGNOSIS — Z11.3 SCREEN FOR STD (SEXUALLY TRANSMITTED DISEASE): ICD-10-CM

## 2019-02-25 DIAGNOSIS — Z01.419 WELL WOMAN EXAM WITH ROUTINE GYNECOLOGICAL EXAM: Primary | ICD-10-CM

## 2019-02-25 DIAGNOSIS — Z12.4 PAP SMEAR FOR CERVICAL CANCER SCREENING: ICD-10-CM

## 2019-02-25 DIAGNOSIS — N76.0 ACUTE VAGINITIS: ICD-10-CM

## 2019-02-25 PROCEDURE — 99385 PREV VISIT NEW AGE 18-39: CPT | Mod: S$GLB,,, | Performed by: OBSTETRICS & GYNECOLOGY

## 2019-02-25 PROCEDURE — 99385 PR PREVENTIVE VISIT,NEW,18-39: ICD-10-PCS | Mod: S$GLB,,, | Performed by: OBSTETRICS & GYNECOLOGY

## 2019-02-25 PROCEDURE — 87624 HPV HI-RISK TYP POOLED RSLT: CPT

## 2019-02-25 PROCEDURE — 87480 CANDIDA DNA DIR PROBE: CPT

## 2019-02-25 PROCEDURE — 87491 CHLMYD TRACH DNA AMP PROBE: CPT

## 2019-02-25 PROCEDURE — 88175 CYTOPATH C/V AUTO FLUID REDO: CPT

## 2019-02-25 RX ORDER — BUDESONIDE 28 MG/1
AEROSOL, FOAM RECTAL
Refills: 2 | COMMUNITY
Start: 2018-11-29 | End: 2021-02-12

## 2019-02-25 RX ORDER — MESALAMINE 0.38 G/1
CAPSULE, EXTENDED RELEASE ORAL
COMMUNITY
End: 2020-11-13

## 2019-02-25 NOTE — PROGRESS NOTES
Subjective:       Patient ID: Prudence Castaneda is a 28 y.o. female.    Chief Complaint:  Well Woman (+HPV)      History of Present Illness  HPI  This 28 yr old G0 female is new to me and is not having menses due to her birth control. She is not having issues with her OCP She is pos for HPV.her last pap was about a yr ago.   She has been having some itching on and off for past two months. This is new and has not changed anything.   She had the vaccine.    She has no family history of breast or ovarian or uterine ca.      GYN & OB History  No LMP recorded. Patient is not currently having periods (Reason: Birth Control).   Date of Last Pap: No result found    OB History    Para Term  AB Living   0 0 0 0 0 0   SAB TAB Ectopic Multiple Live Births   0 0 0 0 0             Review of Systems  Review of Systems   Constitutional: Negative for chills and fever.   Respiratory: Negative for shortness of breath.    Cardiovascular: Negative for chest pain.   Gastrointestinal: Negative for abdominal pain, nausea and vomiting.   Genitourinary: Negative for difficulty urinating, dyspareunia, genital sores, menstrual problem, pelvic pain, vaginal bleeding, vaginal discharge and vaginal pain.   Skin: Negative for wound.   Hematological: Negative for adenopathy.           Objective:   Physical Exam:   Constitutional: She is oriented to person, place, and time. She appears well-developed and well-nourished.    HENT:   Head: Normocephalic.    Eyes: EOM are normal.    Neck: Normal range of motion.    Cardiovascular: Normal rate.     Pulmonary/Chest: Effort normal. She exhibits no mass and no tenderness. Right breast exhibits no inverted nipple, no mass, no skin change and no tenderness. Left breast exhibits no inverted nipple, no mass, no skin change and no tenderness.        Abdominal: Soft. She exhibits no distension. There is no tenderness.     Genitourinary: Vagina normal and uterus normal. There is no rash, tenderness or  lesion on the right labia. There is no rash, tenderness or lesion on the left labia. Uterus is not tender. Cervix is normal. Right adnexum displays no mass, no tenderness and no fullness. Left adnexum displays no mass, no tenderness and no fullness. Cervix exhibits no discharge.           Musculoskeletal: Normal range of motion.       Neurological: She is alert and oriented to person, place, and time.    Skin: Skin is warm and dry.    Psychiatric: She has a normal mood and affect.          Assessment:        1. Well woman exam with routine gynecological exam    2. Pap smear for cervical cancer screening    3. Screen for STD (sexually transmitted disease)    4. Acute vaginitis               Plan:      contniue OCPs   Pap with HPV  Cultures  follo wup with pt labs.

## 2019-02-27 LAB
C TRACH DNA SPEC QL NAA+PROBE: NOT DETECTED
N GONORRHOEA DNA SPEC QL NAA+PROBE: NOT DETECTED

## 2019-03-01 LAB
HPV HR 12 DNA CVX QL NAA+PROBE: NEGATIVE
HPV16 AG SPEC QL: NEGATIVE
HPV18 DNA SPEC QL NAA+PROBE: NEGATIVE

## 2019-05-08 RX ORDER — LEVONORGESTREL AND ETHINYL ESTRADIOL 0.15-0.03
1 KIT ORAL DAILY
Qty: 90 TABLET | Refills: 3 | Status: SHIPPED | OUTPATIENT
Start: 2019-05-08 | End: 2020-06-03

## 2019-05-08 NOTE — TELEPHONE ENCOUNTER
----- Message from Nathalia Hester sent at 5/8/2019 11:49 AM CDT -----  Contact: Pt  Can the clinic reply in [facility]:No       Please refill the medication(s) listed below. Please call the patient when the prescription(s) is ready for  at the phone number [phone]    Medication #1:levonorgestrel-ethinyl estradiol (SEASONALE) 0.15 mg-30 mcg per tablet    Medication #2:      Preferred Pharmacy: [id number] - [place] - [address]

## 2019-05-30 ENCOUNTER — PATIENT MESSAGE (OUTPATIENT)
Dept: OBSTETRICS AND GYNECOLOGY | Facility: CLINIC | Age: 29
End: 2019-05-30

## 2019-05-30 DIAGNOSIS — B37.31 YEAST VAGINITIS: Primary | ICD-10-CM

## 2019-05-30 LAB
BACTERIAL VAGINOSIS DNA: NEGATIVE
CANDIDA GLABRATA DNA: NEGATIVE
CANDIDA KRUSEI DNA: NEGATIVE
CANDIDA RRNA VAG QL PROBE: POSITIVE
T VAGINALIS RRNA GENITAL QL PROBE: NEGATIVE

## 2019-05-30 RX ORDER — FLUCONAZOLE 150 MG/1
150 TABLET ORAL ONCE
Qty: 1 TABLET | Refills: 1 | Status: SHIPPED | OUTPATIENT
Start: 2019-05-30 | End: 2019-05-30

## 2020-02-19 RX ORDER — FLUCONAZOLE 150 MG/1
150 TABLET ORAL DAILY
Qty: 1 TABLET | Refills: 0 | Status: SHIPPED | OUTPATIENT
Start: 2020-02-19 | End: 2020-02-20

## 2020-02-28 ENCOUNTER — TELEPHONE (OUTPATIENT)
Dept: GASTROENTEROLOGY | Facility: CLINIC | Age: 30
End: 2020-02-28

## 2020-02-28 NOTE — TELEPHONE ENCOUNTER
Can you contact her and get her scheduled for an appt with me in the near future. She's an old [place] of [place]. It would also be helpful to get her labs and notes from [place] as well.     Thanks.

## 2020-03-02 ENCOUNTER — TELEPHONE (OUTPATIENT)
Dept: GASTROENTEROLOGY | Facility: CLINIC | Age: 30
End: 2020-03-02

## 2020-03-03 ENCOUNTER — TELEPHONE (OUTPATIENT)
Dept: GASTROENTEROLOGY | Facility: CLINIC | Age: 30
End: 2020-03-03

## 2020-03-03 NOTE — TELEPHONE ENCOUNTER
Spoke with patient and offered appt and it was accepted on 3/13 at 10:00 a.m.  Pt will get records from [facility].    ----- Message from June Quintero sent at 3/3/2020  9:10 AM CST -----  Pt returning call to Dr. Chau's ofc re: being scheduled for appt    Pt contact [phone]

## 2020-03-12 ENCOUNTER — TELEPHONE (OUTPATIENT)
Dept: GASTROENTEROLOGY | Facility: CLINIC | Age: 30
End: 2020-03-12

## 2020-03-12 NOTE — TELEPHONE ENCOUNTER
Left vm informing pt that she is more than welcome to attend her visit since she doesn't have the coronavirus, or she can cancel her apportionment and reschedule in a couple of months, or if she is interested in a virtual visit then we can possibly get her setup for that        ----- Message from Dg Barajas sent at 3/12/2020  2:27 PM CDT -----  Contact: Patient  Patient contacted department. Patient has a NP follow up appointment scheduled with this provider for 3/13/2020. Pt is concerned on whether she should attend this appointment or reschedule do to Coronovirus concerns. Pt stated she is currently not sick or having in symptoms of fever, shortness of breath or coughing. Please contact pt to advise at [phone]

## 2020-07-13 ENCOUNTER — OFFICE VISIT (OUTPATIENT)
Dept: URGENT CARE | Facility: CLINIC | Age: 30
End: 2020-07-13
Payer: COMMERCIAL

## 2020-07-13 VITALS
OXYGEN SATURATION: 98 % | HEART RATE: 87 BPM | DIASTOLIC BLOOD PRESSURE: 79 MMHG | TEMPERATURE: 99 F | SYSTOLIC BLOOD PRESSURE: 116 MMHG | HEIGHT: 65 IN | WEIGHT: 135 LBS | BODY MASS INDEX: 22.49 KG/M2

## 2020-07-13 DIAGNOSIS — J06.9 UPPER RESPIRATORY TRACT INFECTION, UNSPECIFIED TYPE: ICD-10-CM

## 2020-07-13 DIAGNOSIS — Z11.59 ENCOUNTER FOR SCREENING FOR OTHER VIRAL DISEASES: Primary | ICD-10-CM

## 2020-07-13 DIAGNOSIS — J02.9 SORE THROAT: ICD-10-CM

## 2020-07-13 LAB
CTP QC/QA: YES
MOLECULAR STREP A: NEGATIVE

## 2020-07-13 PROCEDURE — 99201 PR OFFICE/OUTPT VISIT,NEW,LEVL I: ICD-10-PCS | Mod: S$GLB,,, | Performed by: NURSE PRACTITIONER

## 2020-07-13 PROCEDURE — 99201 PR OFFICE/OUTPT VISIT,NEW,LEVL I: CPT | Mod: S$GLB,,, | Performed by: NURSE PRACTITIONER

## 2020-07-13 PROCEDURE — 87651 POCT STREP A MOLECULAR: ICD-10-PCS | Mod: QW,S$GLB,, | Performed by: NURSE PRACTITIONER

## 2020-07-13 PROCEDURE — 87651 STREP A DNA AMP PROBE: CPT | Mod: QW,S$GLB,, | Performed by: NURSE PRACTITIONER

## 2020-07-13 PROCEDURE — U0003 INFECTIOUS AGENT DETECTION BY NUCLEIC ACID (DNA OR RNA); SEVERE ACUTE RESPIRATORY SYNDROME CORONAVIRUS 2 (SARS-COV-2) (CORONAVIRUS DISEASE [COVID-19]), AMPLIFIED PROBE TECHNIQUE, MAKING USE OF HIGH THROUGHPUT TECHNOLOGIES AS DESCRIBED BY CMS-2020-01-R: HCPCS

## 2020-07-13 RX ORDER — MESALAMINE 1000 MG/1
SUPPOSITORY RECTAL
COMMUNITY
Start: 2020-05-13 | End: 2021-03-04 | Stop reason: SDUPTHER

## 2020-07-13 NOTE — PATIENT INSTRUCTIONS
Pharyngitis   If your condition worsens or fails to improve we recommend that you receive another evaluation at the ER immediately or contact your PCP to discuss your concerns or return here. You must understand that you've received an urgent care treatment only and that you may be released before all your medical problems are known or treated. You the patient will arrange for followup care as instructed.   The majority of all sore throats or tonsillitis are viral and antibiotics will not treat this.     If the strep test performed in office was Positive:  - Complete the full course of antibiotics given  - Drink plenty of cool liquids while avoid any beverage or food that can irritate your throat (acidic, spicy or salty foods).  - Throw away your toothbrush now and when you complete your antibiotics.    If the strep culture was done and returns negative in 3-5 days and you are still having a sore throat, you may need to get a mono spot test done or repeated.   Tylenol or ibuprofen for pain may help as long as you are not allergic to these meds or have a medical condition such as stomach ulcers, liver or kidney disease or taking blood thinners etc that would   prevent you from using these medications.   Rest and fluids will help as well.   If you were prescribed antibiotics and are female and on BCP use additional methods to prevent pregnancy while on the antibiotics and for one cycle after.     Instructions for Patients with Confirmed or Suspected COVID-19    If you are awaiting your test result, you will either be called or it will be released to the patient portal.  If you have any questions about your test, please visit [email or web address] or call our COVID-19 information line at [phone].      Preventing the Spread of Coronavirus Disease 2019 (COVID-19) in Homes and Residential Communities -- Patients     Prevention steps for people with  confirmed or suspected COVID-19 (including persons under investigation) who do not need to be hospitalized and people with confirmed COVID-19 who were hospitalized and determined to be medically stable to go home.      Stay home except to get medical care.    Separate yourself from other people and animals in your home.    Call ahead before visiting your doctor.    Wear a face mask.    Cover your coughs and sneezes.    Clean your hands often.    Avoid sharing personal household items.    Clean all high-touch surfaces every day.    Monitor your symptoms. Seek prompt medical attention if your illness is worsening (e.g., difficulty breathing). Before seeking care, call your healthcare provider.    If you have a medical emergency and must call 911, notify the dispatcher that you have or are being evaluated for COVID-19. If possible, put on a face mask before emergency medical services arrive.    Use the following symptom-based strategy to return to normal activity following a suspected or confirmed case of COVID-19. Continue isolation until:   o At least 3 days (72 hours) have passed since recovery defined as resolution of fever without the use of fever-reducing medications and improvement in respiratory symptoms (e.g. cough, shortness of breath), and   o At least 10 days have passed since symptoms first appeared.     Precautions for household members, intimate partners and caregivers in a non-healthcare setting of a patient with symptomatic laboratory-confirmed COVID-19 or a patient under investigation.     Household members, intimate partners and caregivers in a non-healthcare setting may have close contact with a person with symptomatic, laboratory-confirmed COVID-19 or a person under investigation. Close contacts should monitor their health; they should call their healthcare provider right away if they develop symptoms suggestive of COVID-19 (e.g., fever, cough, shortness of breath). Close contacts  should also follow these recommendations:     · Stay home for the duration of the time recommended by healthcare provider, except to get medical care. Separate yourself from other people and animals in the home.  · Monitor the patients symptoms. If the patient is getting sicker, call his or her healthcare provider. If the patient has a medical emergency and you need to call 911, notify the dispatch personnel that the patient has or is being evaluated for COVID-19.   · Wear a facemask when around other people such as sharing a room or vehicle and before entering a healthcare provider's office.  · Cover coughs and sneezes with a tissue. Throw used tissues in a lined trash can immediately and wash hands.  · Clean hands often with soap and water for at least 20 seconds or with an alcohol-based hand , rubbing hands together until they feel dry. Avoid touching your eyes, nose, and mouth with unwashed hands.  · Clean all high-touch; surfaces every day, including counters, tabletops, doorknobs, bathroom fixtures, toilets, phones, keyboards, tablets, bedside tables, etc. Use a household cleaning spray or wipe according to label instructions.  · Avoid sharing personal household items such as dishes, drinking glasses, cups, towels, bedding, etc. After these items are used, they should be washed thoroughly with soap and water.  · Use the following symptom-based strategy to return to normal activity following a suspected or confirmed case of COVID-19. Continue isolation until:   · At least 3 days (72 hours) have passed since recovery defined as resolution of fever without the use of fever-reducing medications and improvement in respiratory symptoms (e.g. cough, shortness of breath), and   · At least 10 days have passed since symptoms first appeared.

## 2020-07-13 NOTE — LETTER
[address] [place] [address] ? [place], [address] ? Phone [phone] ? Fax [phone]           Return to Work/School    Patient: Prudence Castaneda  YOB: 1990   Date: 07/13/2020      To Whom It May Concern:     Prudence Castaneda was in contact with/seen in my office on 07/13/2020. COVID-19 is present in our communities across the state. Not all patients are eligible or appropriate to be tested. In this situation, your employee meets the following criteria:     Prudence Castaneda has met the criteria for COVID-19 testing based upon symptoms, travel, and/or potential exposure. The test has been completed and is pending results at this time. During this time the employee is not able to work and should be quarantined per the Centers for Disease Control timelines.      If you have any questions or concerns, or if I can be of further assistance, please do not hesitate to contact me.     Sincerely,    Herson Elias NP

## 2020-07-13 NOTE — PROGRESS NOTES
"Subjective:       Patient ID: Prudence Castaneda is a 29 y.o. female.    Vitals:  height is 5' 5" (1.651 m) and weight is 61.2 kg (135 lb). Her temperature is 98.6 °F (37 °C). Her blood pressure is 116/79 and her pulse is 87. Her oxygen saturation is 98%.     Chief Complaint: Sore Throat    Sore Throat   This is a new problem. The current episode started yesterday. The problem has been unchanged. There has been no fever. The pain is at a severity of 3/10. The pain is mild. Associated symptoms include trouble swallowing. Pertinent negatives include no congestion, coughing, diarrhea, headaches, shortness of breath or vomiting. She has had no exposure to strep or mono. She has tried nothing for the symptoms.   Dictation #1  MRN:[id number]  CSN:[id number]      Constitution: Negative for chills, fatigue and fever.   HENT: Positive for sore throat and trouble swallowing. Negative for congestion.    Neck: Negative for painful lymph nodes.   Cardiovascular: Negative for chest pain and leg swelling.   Eyes: Negative for double vision and blurred vision.   Respiratory: Negative for cough and shortness of breath.    Gastrointestinal: Negative for nausea, vomiting and diarrhea.   Genitourinary: Negative for dysuria, frequency, urgency and history of kidney stones.   Musculoskeletal: Negative for joint pain, joint swelling, muscle cramps and muscle ache.   Skin: Negative for color change, pale, rash and bruising.   Allergic/Immunologic: Negative for seasonal allergies.   Neurological: Negative for dizziness, history of vertigo, light-headedness, passing out and headaches.   Hematologic/Lymphatic: Negative for swollen lymph nodes.   Psychiatric/Behavioral: Negative for nervous/anxious, sleep disturbance and depression. The patient is not nervous/anxious.        Objective:      Physical Exam      Audio Only Telehealth Visit     The patient location is: [facility]   The chief complaint leading to consultation is:   Visit " type: Virtual visit with audio only (telephone)  Total time spent with patient: 15 mins     The reason for the audio only service rather than synchronous audio and video virtual visit was related to technical difficulties or patient preference/necessity.     Each patient to whom I provide medical services by telemedicine is:  (1) informed of the relationship between the physician and patient and the respective role of any other health care provider with respect to management of the patient; and (2) notified that they may decline to receive medical services by telemedicine and may withdraw from such care at any time. Patient verbally consented to receive this service via voice-only telephone call.       HPI: 29 year old with concerns of COVID-19 and sore throat.  Reports it hurts to swallow and has been in contact with someone with similar symptoms.    Denies fever, body aches, chills, sweats, fatigue, nausea, vomiting, diarrhea, post nasal drip, runny nose, nasal or sinus congestion, shortness of breath, chest/back pain with breathing, feeling winded with activity, dizziness, headache, loss of taste or smell  and cough     Assessment and plan:  See below      This service was not originating from a related E/M service provided within the previous 7 days nor will  to an E/M service or procedure within the next 24 hours or my soonest available appointment.  Prevailing standard of care was able to be met in this audio-only visit.      Results for orders placed or performed in visit on 07/13/20   POCT Strep A, Molecular   Result Value Ref Range    Molecular Strep A, POC Negative Negative     Acceptable Yes       Assessment:       1. Encounter for screening for other viral diseases    2. Sore throat    3. Upper respiratory tract infection, unspecified type        Plan:         Encounter for screening for other viral diseases  -     COVID-19 Routine Screening    Sore throat  -     COVID-19 Routine  Screening  -     POCT Strep A, Molecular    Upper respiratory tract infection, unspecified type      Patient Instructions                                                         Pharyngitis   If your condition worsens or fails to improve we recommend that you receive another evaluation at the ER immediately or contact your PCP to discuss your concerns or return here. You must understand that you've received an urgent care treatment only and that you may be released before all your medical problems are known or treated. You the patient will arrange for followup care as instructed.   The majority of all sore throats or tonsillitis are viral and antibiotics will not treat this.     If the strep test performed in office was Positive:  - Complete the full course of antibiotics given  - Drink plenty of cool liquids while avoid any beverage or food that can irritate your throat (acidic, spicy or salty foods).  - Throw away your toothbrush now and when you complete your antibiotics.    If the strep culture was done and returns negative in 3-5 days and you are still having a sore throat, you may need to get a mono spot test done or repeated.   Tylenol or ibuprofen for pain may help as long as you are not allergic to these meds or have a medical condition such as stomach ulcers, liver or kidney disease or taking blood thinners etc that would   prevent you from using these medications.   Rest and fluids will help as well.   If you were prescribed antibiotics and are female and on BCP use additional methods to prevent pregnancy while on the antibiotics and for one cycle after.     Instructions for Patients with Confirmed or Suspected COVID-19    If you are awaiting your test result, you will either be called or it will be released to the patient portal.  If you have any questions about your test, please visit [email or web address] or call our COVID-19 information line at [phone].      Preventing the Spread of  Coronavirus Disease 2019 (COVID-19) in Homes and Residential Communities -- Patients     Prevention steps for people with confirmed or suspected COVID-19 (including persons under investigation) who do not need to be hospitalized and people with confirmed COVID-19 who were hospitalized and determined to be medically stable to go home.      Stay home except to get medical care.    Separate yourself from other people and animals in your home.    Call ahead before visiting your doctor.    Wear a face mask.    Cover your coughs and sneezes.    Clean your hands often.    Avoid sharing personal household items.    Clean all high-touch surfaces every day.    Monitor your symptoms. Seek prompt medical attention if your illness is worsening (e.g., difficulty breathing). Before seeking care, call your healthcare provider.    If you have a medical emergency and must call 911, notify the dispatcher that you have or are being evaluated for COVID-19. If possible, put on a face mask before emergency medical services arrive.    Use the following symptom-based strategy to return to normal activity following a suspected or confirmed case of COVID-19. Continue isolation until:   o At least 3 days (72 hours) have passed since recovery defined as resolution of fever without the use of fever-reducing medications and improvement in respiratory symptoms (e.g. cough, shortness of breath), and   o At least 10 days have passed since symptoms first appeared.     Precautions for household members, intimate partners and caregivers in a non-healthcare setting of a patient with symptomatic laboratory-confirmed COVID-19 or a patient under investigation.     Household members, intimate partners and caregivers in a non-healthcare setting may have close contact with a person with symptomatic, laboratory-confirmed COVID-19 or a person under investigation. Close contacts should monitor their health; they should call their healthcare provider  right away if they develop symptoms suggestive of COVID-19 (e.g., fever, cough, shortness of breath). Close contacts should also follow these recommendations:     · Stay home for the duration of the time recommended by healthcare provider, except to get medical care. Separate yourself from other people and animals in the home.  · Monitor the patients symptoms. If the patient is getting sicker, call his or her healthcare provider. If the patient has a medical emergency and you need to call 911, notify the dispatch personnel that the patient has or is being evaluated for COVID-19.   · Wear a facemask when around other people such as sharing a room or vehicle and before entering a healthcare provider's office.  · Cover coughs and sneezes with a tissue. Throw used tissues in a lined trash can immediately and wash hands.  · Clean hands often with soap and water for at least 20 seconds or with an alcohol-based hand , rubbing hands together until they feel dry. Avoid touching your eyes, nose, and mouth with unwashed hands.  · Clean all high-touch; surfaces every day, including counters, tabletops, doorknobs, bathroom fixtures, toilets, phones, keyboards, tablets, bedside tables, etc. Use a household cleaning spray or wipe according to label instructions.  · Avoid sharing personal household items such as dishes, drinking glasses, cups, towels, bedding, etc. After these items are used, they should be washed thoroughly with soap and water.  · Use the following symptom-based strategy to return to normal activity following a suspected or confirmed case of COVID-19. Continue isolation until:   · At least 3 days (72 hours) have passed since recovery defined as resolution of fever without the use of fever-reducing medications and improvement in respiratory symptoms (e.g. cough, shortness of breath), and   · At least 10 days have passed since symptoms first appeared.

## 2020-07-15 LAB — SARS-COV-2 RNA RESP QL NAA+PROBE: NOT DETECTED

## 2020-07-20 ENCOUNTER — PATIENT MESSAGE (OUTPATIENT)
Dept: GASTROENTEROLOGY | Facility: CLINIC | Age: 30
End: 2020-07-20

## 2020-08-11 ENCOUNTER — OFFICE VISIT (OUTPATIENT)
Dept: GASTROENTEROLOGY | Facility: CLINIC | Age: 30
End: 2020-08-11
Payer: COMMERCIAL

## 2020-08-11 DIAGNOSIS — K51.00 ULCERATIVE PANCOLITIS WITHOUT COMPLICATION: ICD-10-CM

## 2020-08-11 PROBLEM — K35.80 ACUTE APPENDICITIS: Status: RESOLVED | Noted: 2018-05-02 | Resolved: 2020-08-11

## 2020-08-11 PROCEDURE — 99203 PR OFFICE/OUTPT VISIT, NEW, LEVL III, 30-44 MIN: ICD-10-PCS | Mod: 95,,, | Performed by: INTERNAL MEDICINE

## 2020-08-11 PROCEDURE — 99203 OFFICE O/P NEW LOW 30 MIN: CPT | Mod: 95,,, | Performed by: INTERNAL MEDICINE

## 2020-08-11 RX ORDER — MULTIVITAMIN
1 TABLET ORAL DAILY
COMMUNITY
End: 2022-11-14

## 2020-08-11 NOTE — PROGRESS NOTES
[facility]          Inflammatory Bowel Disease New Patient Consultation Note         TODAY'S VISIT DATE:  8/11/2020    Reason for Consult:    Chief Complaint   Patient presents with    Ulcerative Colitis       PCP: Thais Alanis      Referring MD:   Usha Self    History of Present Illness:      Prudence Castaneda who is a 29 y.o. female is being seen today at the [facility] on 08/11/2020 for inflammatory bowel disease- ulcerative colitis.  She was diagnosed with ulcerative colitis in 2017.  At that time she was having bloody diarrhea as well as mucus in the stools.  She underwent a colonoscopy that revealed endoscopically active disease only involving the distal rectum although biopsies showed chronic inflammatory changes throughout the colon.  She was placed on Canasa suppositories which helped to some degree.  She was also treated with sulfasalazine.  I met her about a year ago because of ongoing symptoms.  At that time she was taking Apriso and Canasa.  She was having about 6-7 loose bowel movements a day in spite of taking medications.  Because of insurance issues there was some delay in getting her scoped to reassess her disease activity.  In September of last year she underwent a colonoscopy as part of the pre enrollment evaluation for clinical trial for mirikizumab and she was found to have active inflammation (Cooper 2) in the distal 10 cm of the rectum but no endoscopically visible disease proximal to this.  Biopsies of the rectum showed chronic active inflammation but biopsies from the remainder of the colon did not show any evidence of inflammation.  She was taking Apriso at that time but was not taking Canasa.  She did not qualify for enrollment in the clinical trial and after discussion of treatment options she elected to start Entyvio.  She started this around October or November of last year.  She has continued to take Apriso  and Canasa along with this.  She reports that she had doing quite well and was only having about 2-3 formed bowel movements per day.  Some more formed but about 50% were still loose.  There was no blood in the stools at that time.  Last week she missed her Canasa suppositories for about 4 days and since that time has noticed an increase in her symptoms.  She has been having an increase in bloating and cramping which are prominent symptoms when she has active disease.  She has had a slight increase in bowel movement frequency as well.  She has not had any blood in the stools recently.    IBD History:  Dx Date:  2017  Disease type/distribution:  Ulcerative colitis/endoscopically active disease in the distal rectum but biopsies with pan colonic chronic inflammatory changes  Current Treatment:  Apriso/Canasa/Entyvio Start Date:  Fall 2019 Response:  Good  Optimized:  No  Adverse reactions:  None  Prior surgeries:  None  CRP Elevation:  Yes-calprotectin will be abnormal during active disease as well  Disease Complications:  None  Extraintestinal manifestations:  None  Prior treatments:   Steroids:  None  5ASA:  Incomplete response  IMM:  None  TNF Inh:  None   Anti-Integrin:  Currently taking   IL 12/23:  None  NICOLE Inh:  None    Previous Clinical Trials:  Attempted enrollment-did not qualify because of limited disease distribution.    Last Colonoscopy:  September 2019    Other Endoscopies:  None    Imaging:   MRE:  None   CT:  None   Other:  None    Pertinent Labs:  Lab Results   Component Value Date    SEDRATE 0 05/01/2018    CRP 12.2 (H) 05/01/2018     No results found for: TTGIGA, IGA  No results found for: TSH, FREET4  No results found for: BHZNKTJL32GN, TWFVIUAW14  No results found for: HEPBSAG, HEPBCAB, HEPCAB  No results found for: KMR81BAXS  No results found for: NIL, TBAG, TBAGNIL, MITOGENNIL, TBGOLD, TSPOTSCREN  No results found for: TPTMINTERP, TPMTRESULT  No results found for: STOOLCULTURE, QDZFIGCXDE8Z,  IGAZQWRSYK2D, CDIFFICILEAN, CDIFFTOX, CDIFFICILEBY  No results found for: CALPROTECTIN    Therapeutic Drug Monitoring Labs:  No results found for: PROMETH  No results found for: ANSADAINIT, INFLIXIMAB, INFLIXINTERP    Vaccinations:  No results found for: HEPBSAB  No results found for: HEPAIGG  No results found for: VARICELLAZOS, VARICELLAINT  Immunization History   Administered Date(s) Administered    Influenza Split 01/09/2015         Review of Systems  Review of Systems   Constitutional: Negative for chills, fever and weight loss.   HENT: Negative for sore throat.    Eyes: Negative for pain and redness.   Respiratory: Negative for cough and shortness of breath.    Cardiovascular: Negative for chest pain.   Gastrointestinal: Negative for abdominal pain, heartburn, nausea and vomiting.   Genitourinary: Negative for dysuria and hematuria.   Musculoskeletal: Negative for back pain.   Skin: Negative for rash.   Neurological: Negative for focal weakness.   Psychiatric/Behavioral: Negative for depression. The patient is not nervous/anxious.        Medical History:   Past Medical History:   Diagnosis Date    Ulcerative colitis        Surgical History:  Past Surgical History:   Procedure Laterality Date    APPENDECTOMY      COLONOSCOPY         Family History:   Family History   Problem Relation Age of Onset    Glaucoma Mother     No Known Problems Father     No Known Problems Brother     No Known Problems Brother        Social History:   Social History     Tobacco Use    Smoking status: Never Smoker    Smokeless tobacco: Never Used   Substance Use Topics    Alcohol use: Yes     Alcohol/week: 3.0 standard drinks     Types: 3 Glasses of wine per week     Comment: per week     Drug use: No       Allergies: Reviewed    Home Medications:   Medication List with Changes/Refills   Current Medications    ERGOCALCIFEROL, VITAMIN D2, (VITAMIN D ORAL)    Take by mouth.    LEVONORGESTREL-ETHINYL ESTRADIOL (SEASONALE) 0.15  MG-30 MCG (91) PER TABLET    TAKE 1 TABLET BY MOUTH EVERY DAY    MESALAMINE (APRISO) 0.375 GRAM CP24    Apriso 0.375 gram capsule,extended release    MESALAMINE (CANASA) 1000 MG SUPP    1 SUPPOSITORY AT BEDTIME ONCE A DAY RECTAL 30 DAY(S)    MULTIVITAMIN (THERAGRAN) PER TABLET    Take 1 tablet by mouth once daily.    ONDANSETRON (ZOFRAN-ODT) 4 MG TBDL    Take 1 tablet (4 mg total) by mouth every 6 (six) hours as needed.    RIFAXIMIN (XIFAXAN) 200 MG TAB    Take 550 mg by mouth 3 (three) times daily.    SPIRONOLACTONE (ALDACTONE) 50 MG TABLET    Take 50 mg by mouth once daily.    UCERIS 2 MG/ACTUATION FOAM    INSERT 1 PUMP(S) EVERY DAY BY RECTAL ROUTE FOR 28 DAYS.    VEDOLIZUMAB (ENTYVIO IV)    Inject into the vein.       Physical Exam:  Vital Signs:  There were no vitals taken for this visit.  There is no height or weight on file to calculate BMI.    Physical Exam   Constitutional: She is oriented to person, place, and time. She appears well-developed and well-nourished.   Neurological: She is alert and oriented to person, place, and time.   Psychiatric: She has a normal mood and affect. Her behavior is normal. Thought content normal.   Nursing note reviewed.    Telemedicine visit. Remainder of physical unable to be completed.    Labs: reviewed and pertinent noted above    Assessment/Plan:  Prudence Castaneda is a 29 y.o. female with ulcerative colitis with disease predominantly in the distal rectum. The following issues were addresssed:    1. Ulcerative pancolitis without complication      1.  Ulcerative colitis:  Clinically she appears to have disease more consistent with ulcerative proctitis although her initial biopsies did show chronic inflammatory changes throughout the entire colon in spite of a lack of endoscopic activity.  She has been treated as if she has ulcerative colitis.  The rectal disease has been the most difficult to manage and has not responded well to 5 ASAs in the past.  She is currently on Entyvio  and was doing well taking this along with Canasa suppositories but it has recently had an increase in symptoms after missing a few doses.  She has restarted the Canasa.  Today I recommend that we continue her current medications.  We will check labs prior to her next Entyvio infusion.  We will obtain a CBC, CMP, Entyvio level, CRP, and fecal calprotectin at that time.  If we need to adjust her medication does we can do it at that time.  Ideally we will plan to perform a colonoscopy or a flex sig to assess her disease activity but given her high co-pay is will try to manage her with noninvasive evaluation.      # IBD specific health maintenance:  Colon cancer surveillance:  Up-to-date    Annual:  - Eye exam:  Review in the future  - Skin exam (if on IMM/TNF):  Not applicable  - reminded pt to use sunblock/hats/sunprotective clothing  - PAP (if immunosuppressed):  Up-to-date    DEXA:  Not applicable    Vitamin D:  On supplementation    Vaccines:    She will get us her vaccination record soon    Follow up: Follow up in about 3 months (around 11/11/2020).    Thank you again for sending Prudence Tonya to see Dr. Sung Chau today at the [facility]. Please don't hesitate to contact Dr. Chau if there are any questions regarding this evaluation, or if you have any other patients with inflammatory bowel disease for whom you would like a consultation. You can reach Dr. Chau at [phone] or by email at [email or web address]    Jason Chau MD  Department of Gastroenterology  Inflammatory Bowel Disease    The patient location is:  [place], [place]  The chief complaint leading to consultation is:  Ulcerative colitis    Visit type: audiovisual    Face to Face time with patient:  25 min  Thirty-five minutes of total time spent on the encounter, which includes face to face time and non-face to face time preparing to see the patient (eg, review of tests), Obtaining and/or  reviewing separately obtained history, Documenting clinical information in the electronic or other health record, Independently interpreting results (not separately reported) and communicating results to the patient/family/caregiver, or Care coordination (not separately reported).         Each patient to whom he or she provides medical services by telemedicine is:  (1) informed of the relationship between the physician and patient and the respective role of any other health care provider with respect to management of the patient; and (2) notified that he or she may decline to receive medical services by telemedicine and may withdraw from such care at any time.          Answers for HPI/ROS submitted by the patient on 8/11/2020   trouble swallowing: No  Joint pain? : No

## 2020-08-19 DIAGNOSIS — K51.00 ULCERATIVE PANCOLITIS WITHOUT COMPLICATION: Primary | ICD-10-CM

## 2020-08-19 RX ORDER — MESALAMINE 1000 MG/1
1000 SUPPOSITORY RECTAL NIGHTLY
Qty: 30 SUPPOSITORY | Refills: 5 | Status: SHIPPED | OUTPATIENT
Start: 2020-08-19 | End: 2020-09-18

## 2020-09-03 ENCOUNTER — PATIENT MESSAGE (OUTPATIENT)
Dept: GASTROENTEROLOGY | Facility: CLINIC | Age: 30
End: 2020-09-03

## 2020-09-03 RX ORDER — BUDESONIDE 2 MG/1
2 AEROSOL, FOAM RECTAL 2 TIMES DAILY
Qty: 133.6 G | Refills: 1 | Status: SHIPPED | OUTPATIENT
Start: 2020-09-03 | End: 2021-02-12

## 2020-09-08 ENCOUNTER — PATIENT MESSAGE (OUTPATIENT)
Dept: GASTROENTEROLOGY | Facility: HOSPITAL | Age: 30
End: 2020-09-08

## 2020-09-08 ENCOUNTER — TELEPHONE (OUTPATIENT)
Dept: GASTROENTEROLOGY | Facility: HOSPITAL | Age: 30
End: 2020-09-08

## 2020-09-08 ENCOUNTER — PATIENT MESSAGE (OUTPATIENT)
Dept: GASTROENTEROLOGY | Facility: CLINIC | Age: 30
End: 2020-09-08

## 2020-09-08 NOTE — TELEPHONE ENCOUNTER
----- Message from Miriam Issa MA sent at 9/8/2020  3:32 PM CDT -----  Contact: Gillian with [employer] [phone]  Gillian with [employer] [phone]      Calling to say  that UCERIS 2 mg/actuation Foam has been approved for another year , she did also notify the pharmacy

## 2020-09-14 ENCOUNTER — PATIENT MESSAGE (OUTPATIENT)
Dept: GASTROENTEROLOGY | Facility: HOSPITAL | Age: 30
End: 2020-09-14

## 2020-09-21 ENCOUNTER — LAB VISIT (OUTPATIENT)
Dept: LAB | Facility: OTHER | Age: 30
End: 2020-09-21
Attending: INTERNAL MEDICINE
Payer: COMMERCIAL

## 2020-09-21 DIAGNOSIS — K51.00 ULCERATIVE PANCOLITIS WITHOUT COMPLICATION: ICD-10-CM

## 2020-09-21 LAB
ALBUMIN SERPL BCP-MCNC: 4.4 G/DL (ref 3.5–5.2)
ALP SERPL-CCNC: 41 U/L (ref 55–135)
ALT SERPL W/O P-5'-P-CCNC: 17 U/L (ref 10–44)
ANION GAP SERPL CALC-SCNC: 10 MMOL/L (ref 8–16)
AST SERPL-CCNC: 14 U/L (ref 10–40)
BASOPHILS # BLD AUTO: 0.05 K/UL (ref 0–0.2)
BASOPHILS NFR BLD: 0.6 % (ref 0–1.9)
BILIRUB SERPL-MCNC: 0.5 MG/DL (ref 0.1–1)
BUN SERPL-MCNC: 11 MG/DL (ref 6–20)
CALCIUM SERPL-MCNC: 9.4 MG/DL (ref 8.7–10.5)
CHLORIDE SERPL-SCNC: 103 MMOL/L (ref 95–110)
CO2 SERPL-SCNC: 26 MMOL/L (ref 23–29)
CREAT SERPL-MCNC: 1 MG/DL (ref 0.5–1.4)
CRP SERPL-MCNC: 2.6 MG/L (ref 0–8.2)
DIFFERENTIAL METHOD: NORMAL
EOSINOPHIL # BLD AUTO: 0.1 K/UL (ref 0–0.5)
EOSINOPHIL NFR BLD: 1.2 % (ref 0–8)
ERYTHROCYTE [DISTWIDTH] IN BLOOD BY AUTOMATED COUNT: 12.2 % (ref 11.5–14.5)
EST. GFR  (AFRICAN AMERICAN): >60 ML/MIN/1.73 M^2
EST. GFR  (NON AFRICAN AMERICAN): >60 ML/MIN/1.73 M^2
GLUCOSE SERPL-MCNC: 88 MG/DL (ref 70–110)
HCT VFR BLD AUTO: 41 % (ref 37–48.5)
HGB BLD-MCNC: 13.8 G/DL (ref 12–16)
IMM GRANULOCYTES # BLD AUTO: 0.04 K/UL (ref 0–0.04)
IMM GRANULOCYTES NFR BLD AUTO: 0.5 % (ref 0–0.5)
LYMPHOCYTES # BLD AUTO: 2.7 K/UL (ref 1–4.8)
LYMPHOCYTES NFR BLD: 31.5 % (ref 18–48)
MCH RBC QN AUTO: 30.6 PG (ref 27–31)
MCHC RBC AUTO-ENTMCNC: 33.7 G/DL (ref 32–36)
MCV RBC AUTO: 91 FL (ref 82–98)
MONOCYTES # BLD AUTO: 0.6 K/UL (ref 0.3–1)
MONOCYTES NFR BLD: 7.2 % (ref 4–15)
NEUTROPHILS # BLD AUTO: 5 K/UL (ref 1.8–7.7)
NEUTROPHILS NFR BLD: 59 % (ref 38–73)
NRBC BLD-RTO: 0 /100 WBC
PLATELET # BLD AUTO: 326 K/UL (ref 150–350)
PMV BLD AUTO: 9.9 FL (ref 9.2–12.9)
POTASSIUM SERPL-SCNC: 4.5 MMOL/L (ref 3.5–5.1)
PROT SERPL-MCNC: 7.4 G/DL (ref 6–8.4)
RBC # BLD AUTO: 4.51 M/UL (ref 4–5.4)
SODIUM SERPL-SCNC: 139 MMOL/L (ref 136–145)
WBC # BLD AUTO: 8.44 K/UL (ref 3.9–12.7)

## 2020-09-21 PROCEDURE — 80053 COMPREHEN METABOLIC PANEL: CPT

## 2020-09-21 PROCEDURE — 85025 COMPLETE CBC W/AUTO DIFF WBC: CPT

## 2020-09-21 PROCEDURE — 80280 DRUG ASSAY VEDOLIZUMAB: CPT

## 2020-09-21 PROCEDURE — 36415 COLL VENOUS BLD VENIPUNCTURE: CPT

## 2020-09-21 PROCEDURE — 86140 C-REACTIVE PROTEIN: CPT

## 2020-09-21 PROCEDURE — 83993 ASSAY FOR CALPROTECTIN FECAL: CPT

## 2020-09-22 ENCOUNTER — TELEPHONE (OUTPATIENT)
Dept: GASTROENTEROLOGY | Facility: HOSPITAL | Age: 30
End: 2020-09-22

## 2020-09-22 NOTE — TELEPHONE ENCOUNTER
----- Message from Ruthie Noble sent at 9/22/2020 10:23 AM CDT -----  Pt is calling to speak with the nurse and would like for the nurse to give her a call back

## 2020-09-28 LAB
CALPROTECTIN STL-MCNT: <27.1 MCG/G
VEDOLIZUMAB AND ANTI-VEDOLIZUMAB AB: NORMAL

## 2020-09-30 ENCOUNTER — PATIENT MESSAGE (OUTPATIENT)
Dept: GASTROENTEROLOGY | Facility: HOSPITAL | Age: 30
End: 2020-09-30

## 2020-11-02 RX ORDER — MESALAMINE 0.38 G/1
1.5 CAPSULE, EXTENDED RELEASE ORAL DAILY
Qty: 120 CAPSULE | Refills: 11 | Status: SHIPPED | OUTPATIENT
Start: 2020-11-02 | End: 2022-01-12

## 2020-11-02 NOTE — TELEPHONE ENCOUNTER
Received refill request for pts Apriso Rx.    Allergies reviewed    Last labs: 09/2020    Next appt: 11/13/2020    RX pended for approval

## 2020-11-02 NOTE — TELEPHONE ENCOUNTER
----- Message from Jailene Arias sent at 11/2/2020 10:17 AM CST -----  Pt would like to receive a call back regarding a refill for mesalamine (APRISO) 0.375 gram Cp24    Contact info [phone]    [facility]Y #[id number] - [place], [place] - [address]

## 2020-11-13 ENCOUNTER — TELEPHONE (OUTPATIENT)
Dept: GASTROENTEROLOGY | Facility: CLINIC | Age: 30
End: 2020-11-13

## 2020-11-13 ENCOUNTER — PATIENT MESSAGE (OUTPATIENT)
Dept: GASTROENTEROLOGY | Facility: CLINIC | Age: 30
End: 2020-11-13

## 2020-11-13 ENCOUNTER — OFFICE VISIT (OUTPATIENT)
Dept: GASTROENTEROLOGY | Facility: CLINIC | Age: 30
End: 2020-11-13
Attending: INTERNAL MEDICINE
Payer: COMMERCIAL

## 2020-11-13 DIAGNOSIS — K51.00 ULCERATIVE PANCOLITIS WITHOUT COMPLICATION: Primary | ICD-10-CM

## 2020-11-13 PROCEDURE — 99214 OFFICE O/P EST MOD 30 MIN: CPT | Mod: 95,,, | Performed by: INTERNAL MEDICINE

## 2020-11-13 PROCEDURE — 99214 PR OFFICE/OUTPT VISIT, EST, LEVL IV, 30-39 MIN: ICD-10-PCS | Mod: 95,,, | Performed by: INTERNAL MEDICINE

## 2020-11-13 RX ORDER — HEPARIN 100 UNIT/ML
500 SYRINGE INTRAVENOUS
Status: CANCELLED | OUTPATIENT
Start: 2020-11-13

## 2020-11-13 RX ORDER — CETIRIZINE HYDROCHLORIDE 10 MG/1
10 TABLET ORAL ONCE
Status: CANCELLED | OUTPATIENT
Start: 2020-11-13

## 2020-11-13 RX ORDER — METHYLPREDNISOLONE SOD SUCC 125 MG
40 VIAL (EA) INJECTION
Status: CANCELLED | OUTPATIENT
Start: 2020-11-13

## 2020-11-13 RX ORDER — ACETAMINOPHEN 325 MG/1
650 TABLET ORAL
Status: CANCELLED | OUTPATIENT
Start: 2020-11-13

## 2020-11-13 RX ORDER — DIPHENHYDRAMINE HYDROCHLORIDE 50 MG/ML
25 INJECTION INTRAMUSCULAR; INTRAVENOUS
Status: CANCELLED | OUTPATIENT
Start: 2020-11-13

## 2020-11-13 RX ORDER — EPINEPHRINE 1 MG/ML
0.3 INJECTION, SOLUTION, CONCENTRATE INTRAVENOUS
Status: CANCELLED | OUTPATIENT
Start: 2020-11-13

## 2020-11-13 RX ORDER — IPRATROPIUM BROMIDE AND ALBUTEROL SULFATE 2.5; .5 MG/3ML; MG/3ML
3 SOLUTION RESPIRATORY (INHALATION)
Status: CANCELLED | OUTPATIENT
Start: 2020-11-13

## 2020-11-13 RX ORDER — SODIUM CHLORIDE 0.9 % (FLUSH) 0.9 %
10 SYRINGE (ML) INJECTION
Status: CANCELLED | OUTPATIENT
Start: 2020-11-13

## 2020-11-13 RX ORDER — ACETAMINOPHEN 325 MG/1
650 TABLET ORAL ONCE
Status: CANCELLED | OUTPATIENT
Start: 2020-11-13

## 2020-11-13 NOTE — PATIENT INSTRUCTIONS
1. Increase Entyvio to every 4 weeks  2. Continue other medications  3. Follow up in 3 months  4. Get Flu shot

## 2020-11-13 NOTE — PROGRESS NOTES
[facility]          Inflammatory Bowel Disease New Patient Consultation Note         TODAY'S VISIT DATE:  11/13/2020    Reason for Consult:    Chief Complaint   Patient presents with    Ulcerative Colitis       PCP: Thais Alanis      Referring MD:   No ref. provider found    History of Present Illness:      Prudence Castaneda who is a 30 y.o. female is being seen today at the [facility] on 11/13/2020 for inflammatory bowel disease- ulcerative colitis.   After restarting Canasa she was doing better for a while but then had a little bit of a flare and so we started Uceris again and she took this for a short period of time and it helped significantly.  She noticed less bloating.  She never had a significant increase in her bowel frequency and continues to have 2-3 bowel movements per day.  Most of her bowel movements are loose now.  She notices the increase in symptoms within a few weeks of her next dose and after her infusion she actually feels much better.  She has not seen any blood in her stools.  She is currently on Entyvio 300 mg every 8 weeks, Apriso 4 capsules daily, and Canasa suppositories daily.    IBD History:  She was diagnosed with ulcerative colitis in 2017.  At that time she was having bloody diarrhea as well as mucus in the stools.  She underwent a colonoscopy that revealed endoscopically active disease only involving the distal rectum although biopsies showed chronic inflammatory changes throughout the colon.  She was placed on Canasa suppositories which helped to some degree.  She was also treated with sulfasalazine.  I met her about a year ago because of ongoing symptoms.  At that time she was taking Apriso and Canasa.  She was having about 6-7 loose bowel movements a day in spite of taking medications.  Because of insurance issues there was some delay in getting her scoped to reassess her disease activity.  In September of last  year she underwent a colonoscopy as part of the pre enrollment evaluation for clinical trial for mirikizumab and she was found to have active inflammation (Cooper 2) in the distal 10 cm of the rectum but no endoscopically visible disease proximal to this.  Biopsies of the rectum showed chronic active inflammation but biopsies from the remainder of the colon did not show any evidence of inflammation.  She was taking Apriso at that time but was not taking Canasa.  She did not qualify for enrollment in the clinical trial and after discussion of treatment options she elected to start Entyvio.  She started this around October or November of last year.  She has continued to take Apriso and Canasa along with this.     IBD Details:  Dx Date:  2017  Disease type/distribution:  Ulcerative colitis/endoscopically active disease in the distal rectum but biopsies with pan colonic chronic inflammatory changes  Current Treatment:  Apriso/Canasa/Entyvio Start Date:  Fall 2019 Response:  Good  Optimized:  No  Adverse reactions:  None  Prior surgeries:  None  CRP Elevation:  Yes-calprotectin will be abnormal during active disease as well  Disease Complications:  None  Extraintestinal manifestations:  None  Prior treatments:   Steroids:  None  5ASA:  Incomplete response  IMM:  None  TNF Inh:  None   Anti-Integrin:  Currently taking   IL 12/23:  None  NICOLE Inh:  None    Previous Clinical Trials:  Attempted enrollment-did not qualify because of limited disease distribution.    Last Colonoscopy:  September 2019    Other Endoscopies:  None    Imaging:   MRE:  None   CT:  None   Other:  None    Pertinent Labs:  Lab Results   Component Value Date    SEDRATE 0 05/01/2018    CRP 2.6 09/21/2020     No results found for: TTGIGA, IGA  No results found for: TSH, FREET4  No results found for: IXXIBNKT06TP, USHIFFQV32  No results found for: HEPBSAG, HEPBCAB, HEPCAB  No results found for: HIY36FVWM  No results found for: NIL, TBAG, TBAGNIL, MITOGENNIL,  TBGOLD, TSPOTSCREN  No results found for: TPTMINTERP, TPMTRESULT  No results found for: STOOLCULTURE, ZVYWDXHDAX6R, RKWXLTITSD3A, CDIFFICILEAN, CDIFFTOX, CDIFFICILEBY  Lab Results   Component Value Date    CALPROTECTIN <27.1 09/21/2020       Therapeutic Drug Monitoring Labs:  No results found for: PROMETH  No results found for: ANSADAINIT, INFLIXIMAB, INFLIXINTERP    Vaccinations:  No results found for: HEPBSAB  No results found for: HEPAIGG  No results found for: VARICELLAZOS, VARICELLAINT  Immunization History   Administered Date(s) Administered    Influenza Split 01/09/2015         Review of Systems  Review of Systems   Constitutional: Negative for chills, fever and weight loss.   HENT: Negative for sore throat.    Eyes: Negative for pain and redness.   Respiratory: Negative for cough and shortness of breath.    Cardiovascular: Negative for chest pain.   Gastrointestinal: Negative for abdominal pain, heartburn, nausea and vomiting.   Genitourinary: Negative for dysuria and hematuria.   Musculoskeletal: Negative for back pain.   Skin: Negative for rash.   Neurological: Negative for focal weakness.   Psychiatric/Behavioral: Negative for depression. The patient is not nervous/anxious.        Medical History:   Past Medical History:   Diagnosis Date    Ulcerative colitis        Surgical History:  Past Surgical History:   Procedure Laterality Date    APPENDECTOMY      COLONOSCOPY         Family History:   Family History   Problem Relation Age of Onset    Glaucoma Mother     No Known Problems Father     No Known Problems Brother     No Known Problems Brother        Social History:   Social History     Tobacco Use    Smoking status: Never Smoker    Smokeless tobacco: Never Used   Substance Use Topics    Alcohol use: Yes     Alcohol/week: 3.0 standard drinks     Types: 3 Glasses of wine per week     Comment: per week     Drug use: No       Allergies: Reviewed    Home Medications:   Medication List with  Changes/Refills   Current Medications    BUDESONIDE (UCERIS) 2 MG/ACTUATION FOAM    Place 2 mg rectally 2 (two) times a day. 1 pump twice daily for 2 weeks then 1 pump every night for 4 weeks    ERGOCALCIFEROL, VITAMIN D2, (VITAMIN D ORAL)    Take by mouth.    LEVONORGESTREL-ETHINYL ESTRADIOL (SEASONALE) 0.15 MG-30 MCG (91) PER TABLET    TAKE 1 TABLET BY MOUTH EVERY DAY    MESALAMINE (APRISO) 0.375 GRAM CP24    Take 4 capsules (1.5 g total) by mouth once daily.    MESALAMINE (CANASA) 1000 MG SUPP    1 SUPPOSITORY AT BEDTIME ONCE A DAY RECTAL 30 DAY(S)    MULTIVITAMIN (THERAGRAN) PER TABLET    Take 1 tablet by mouth once daily.    SPIRONOLACTONE (ALDACTONE) 50 MG TABLET    Take 50 mg by mouth once daily.    UCERIS 2 MG/ACTUATION FOAM    INSERT 1 PUMP(S) EVERY DAY BY RECTAL ROUTE FOR 28 DAYS.    VEDOLIZUMAB (ENTYVIO IV)    Inject into the vein every 8 weeks.    Discontinued Medications    MESALAMINE (APRISO) 0.375 GRAM CP24    Apriso 0.375 gram capsule,extended release    ONDANSETRON (ZOFRAN-ODT) 4 MG TBDL    Take 1 tablet (4 mg total) by mouth every 6 (six) hours as needed.    RIFAXIMIN (XIFAXAN) 200 MG TAB    Take 550 mg by mouth 3 (three) times daily.       Physical Exam:  Vital Signs:  There were no vitals taken for this visit.  There is no height or weight on file to calculate BMI.    Physical Exam   Constitutional: She is oriented to person, place, and time. She appears well-developed and well-nourished.   Neurological: She is alert and oriented to person, place, and time.   Psychiatric: She has a normal mood and affect. Her behavior is normal. Thought content normal.   Nursing note reviewed.    Telemedicine visit. Remainder of physical unable to be completed.    Labs: reviewed and pertinent noted above    Assessment/Plan:  Prudence Castaneda is a 30 y.o. female with ulcerative colitis with disease predominantly in the distal rectum. The following issues were addresssed:    No diagnosis found.  1.  Ulcerative  colitis:  Clinically she appears to have disease more consistent with ulcerative proctitis although her initial biopsies did show chronic inflammatory changes throughout the entire colon in spite of a lack of endoscopic activity.  She has been treated as if she has ulcerative colitis although her most recent colonoscopy (on 5 ASA therapy) did not show any inflammation in the remainder of the colon.  The rectal disease has been the most difficult to manage and has not responded well to 5 ASAs in the past.  She is currently on Entyvio, Canasa, and Apriso and she does pretty well and up until a few weeks before her next infusion.  Her recent Entyvio level was a little bit low (13) and there were no antibodies found.  I think it would be reasonable to increase the dose of Entyvio to every 4 weeks.  If she does not respond to this we may need to consider a repeat endoscopy.  Other treatment options would be rectal tacrolimus followed by continuing Canasa support      # IBD specific health maintenance:  Colon cancer surveillance:  Up-to-date    Annual:  - Eye exam:  Review in the future  - Skin exam (if on IMM/TNF):  Not applicable  - reminded pt to use sunblock/hats/sunprotective clothing  - PAP (if immunosuppressed):  Up-to-date    DEXA:  Not applicable    Vitamin D:  On supplementation    Vaccines:    Vaccines are up-to-date    Follow up: Follow up in about 3 months (around 2/13/2021).    Thank you again for sending Prudence Castaneda to see Dr. Sung Chau today at the [facility]. Please don't hesitate to contact Dr. Chau if there are any questions regarding this evaluation, or if you have any other patients with inflammatory bowel disease for whom you would like a consultation. You can reach Dr. Chau at [phone] or by email at [email or web address]    Jason Chau MD  Department of Gastroenterology  Inflammatory Bowel Disease    The patient location is: [facility]  [place]  The chief complaint leading to consultation is: ulcerative procitis    Visit type: audiovisual    Face to Face time with patient: 15  25 minutes of total time spent on the encounter, which includes face to face time and non-face to face time preparing to see the patient (eg, review of tests), Obtaining and/or reviewing separately obtained history, Documenting clinical information in the electronic or other health record, Independently interpreting results (not separately reported) and communicating results to the patient/family/caregiver, or Care coordination (not separately reported).         Each patient to whom he or she provides medical services by telemedicine is:  (1) informed of the relationship between the physician and patient and the respective role of any other health care provider with respect to management of the patient; and (2) notified that he or she may decline to receive medical services by telemedicine and may withdraw from such care at any time.    Notes:

## 2020-12-01 ENCOUNTER — TELEPHONE (OUTPATIENT)
Dept: GASTROENTEROLOGY | Facility: CLINIC | Age: 30
End: 2020-12-01

## 2020-12-01 NOTE — TELEPHONE ENCOUNTER
----- Message from Henna Geiger sent at 12/1/2020 11:08 AM CST -----  Contact: [employer]  An appeal letter is being sent over for a denial in pt's infusion         Contact Info

## 2020-12-01 NOTE — TELEPHONE ENCOUNTER
Received faxed [employer]   PA for Entyvio every 4 wks was denied   Call and spoke to MATT Smith   Peer to peer was schedule with Dr Chau in 48-72 hours

## 2020-12-01 NOTE — TELEPHONE ENCOUNTER
Called Partha   Was told they faxed over the denial and the letter for pts frequency increase   I gave her our direct fax line to re fax

## 2020-12-04 NOTE — TELEPHONE ENCOUNTER
Peer to peer for Entyvio every 4 wks was approved   Call and advisjoy Courtney at [employer] was approved

## 2021-01-12 ENCOUNTER — TELEPHONE (OUTPATIENT)
Dept: GASTROENTEROLOGY | Facility: CLINIC | Age: 31
End: 2021-01-12

## 2021-02-12 ENCOUNTER — TELEPHONE (OUTPATIENT)
Dept: GASTROENTEROLOGY | Facility: CLINIC | Age: 31
End: 2021-02-12

## 2021-02-12 ENCOUNTER — OFFICE VISIT (OUTPATIENT)
Dept: GASTROENTEROLOGY | Facility: CLINIC | Age: 31
End: 2021-02-12
Payer: COMMERCIAL

## 2021-02-12 DIAGNOSIS — K51.00 ULCERATIVE PANCOLITIS WITHOUT COMPLICATION: Primary | ICD-10-CM

## 2021-02-12 PROCEDURE — 99213 PR OFFICE/OUTPT VISIT, EST, LEVL III, 20-29 MIN: ICD-10-PCS | Mod: 95,,, | Performed by: INTERNAL MEDICINE

## 2021-02-12 PROCEDURE — 99213 OFFICE O/P EST LOW 20 MIN: CPT | Mod: 95,,, | Performed by: INTERNAL MEDICINE

## 2021-02-15 ENCOUNTER — DOCUMENTATION ONLY (OUTPATIENT)
Dept: GASTROENTEROLOGY | Facility: CLINIC | Age: 31
End: 2021-02-15

## 2021-02-18 ENCOUNTER — DOCUMENTATION ONLY (OUTPATIENT)
Dept: GASTROENTEROLOGY | Facility: CLINIC | Age: 31
End: 2021-02-18

## 2021-03-03 ENCOUNTER — TELEPHONE (OUTPATIENT)
Dept: GASTROENTEROLOGY | Facility: CLINIC | Age: 31
End: 2021-03-03

## 2021-03-04 DIAGNOSIS — D84.9 IMMUNOSUPPRESSION: Primary | ICD-10-CM

## 2021-03-04 DIAGNOSIS — K51.00 ULCERATIVE PANCOLITIS WITHOUT COMPLICATION: ICD-10-CM

## 2021-03-04 RX ORDER — MESALAMINE 1000 MG/1
1000 SUPPOSITORY RECTAL NIGHTLY
Qty: 30 SUPPOSITORY | Refills: 2 | Status: SHIPPED | OUTPATIENT
Start: 2021-03-04 | End: 2021-06-02

## 2021-03-12 ENCOUNTER — DOCUMENTATION ONLY (OUTPATIENT)
Dept: GASTROENTEROLOGY | Facility: CLINIC | Age: 31
End: 2021-03-12

## 2021-04-01 ENCOUNTER — OFFICE VISIT (OUTPATIENT)
Dept: OBSTETRICS AND GYNECOLOGY | Facility: CLINIC | Age: 31
End: 2021-04-01
Payer: COMMERCIAL

## 2021-04-01 VITALS
HEIGHT: 65 IN | DIASTOLIC BLOOD PRESSURE: 78 MMHG | WEIGHT: 131.63 LBS | SYSTOLIC BLOOD PRESSURE: 124 MMHG | BODY MASS INDEX: 21.93 KG/M2

## 2021-04-01 DIAGNOSIS — N76.0 ACUTE VAGINITIS: Primary | ICD-10-CM

## 2021-04-01 PROCEDURE — 99213 PR OFFICE/OUTPT VISIT, EST, LEVL III, 20-29 MIN: ICD-10-PCS | Mod: S$GLB,,, | Performed by: NURSE PRACTITIONER

## 2021-04-01 PROCEDURE — 99213 OFFICE O/P EST LOW 20 MIN: CPT | Mod: S$GLB,,, | Performed by: NURSE PRACTITIONER

## 2021-04-01 PROCEDURE — 3008F BODY MASS INDEX DOCD: CPT | Mod: CPTII,S$GLB,, | Performed by: NURSE PRACTITIONER

## 2021-04-01 PROCEDURE — 99999 PR PBB SHADOW E&M-EST. PATIENT-LVL III: ICD-10-PCS | Mod: PBBFAC,,, | Performed by: NURSE PRACTITIONER

## 2021-04-01 PROCEDURE — 1125F PR PAIN SEVERITY QUANTIFIED, PAIN PRESENT: ICD-10-PCS | Mod: S$GLB,,, | Performed by: NURSE PRACTITIONER

## 2021-04-01 PROCEDURE — 99999 PR PBB SHADOW E&M-EST. PATIENT-LVL III: CPT | Mod: PBBFAC,,, | Performed by: NURSE PRACTITIONER

## 2021-04-01 PROCEDURE — 3008F PR BODY MASS INDEX (BMI) DOCUMENTED: ICD-10-PCS | Mod: CPTII,S$GLB,, | Performed by: NURSE PRACTITIONER

## 2021-04-01 PROCEDURE — 87660 TRICHOMONAS VAGIN DIR PROBE: CPT | Performed by: NURSE PRACTITIONER

## 2021-04-01 PROCEDURE — 1125F AMNT PAIN NOTED PAIN PRSNT: CPT | Mod: S$GLB,,, | Performed by: NURSE PRACTITIONER

## 2021-04-01 PROCEDURE — 87510 GARDNER VAG DNA DIR PROBE: CPT | Performed by: NURSE PRACTITIONER

## 2021-04-01 RX ORDER — FLUCONAZOLE 150 MG/1
150 TABLET ORAL DAILY
Qty: 1 TABLET | Refills: 1 | Status: SHIPPED | OUTPATIENT
Start: 2021-04-01 | End: 2021-04-02

## 2021-04-01 RX ORDER — METRONIDAZOLE 7.5 MG/G
1 GEL VAGINAL NIGHTLY
Qty: 70 G | Refills: 0 | Status: SHIPPED | OUTPATIENT
Start: 2021-04-01 | End: 2021-04-06

## 2021-04-03 LAB
CANDIDA RRNA VAG QL PROBE: NEGATIVE
G VAGINALIS RRNA GENITAL QL PROBE: POSITIVE
T VAGINALIS RRNA GENITAL QL PROBE: NEGATIVE

## 2021-04-06 ENCOUNTER — PATIENT MESSAGE (OUTPATIENT)
Dept: OBSTETRICS AND GYNECOLOGY | Facility: CLINIC | Age: 31
End: 2021-04-06

## 2021-04-15 ENCOUNTER — DOCUMENTATION ONLY (OUTPATIENT)
Dept: GASTROENTEROLOGY | Facility: CLINIC | Age: 31
End: 2021-04-15

## 2021-05-07 ENCOUNTER — DOCUMENTATION ONLY (OUTPATIENT)
Dept: GASTROENTEROLOGY | Facility: CLINIC | Age: 31
End: 2021-05-07

## 2021-05-13 ENCOUNTER — TELEPHONE (OUTPATIENT)
Dept: GASTROENTEROLOGY | Facility: CLINIC | Age: 31
End: 2021-05-13

## 2021-06-23 ENCOUNTER — TELEPHONE (OUTPATIENT)
Dept: GASTROENTEROLOGY | Facility: CLINIC | Age: 31
End: 2021-06-23

## 2021-06-23 ENCOUNTER — PATIENT MESSAGE (OUTPATIENT)
Dept: GASTROENTEROLOGY | Facility: CLINIC | Age: 31
End: 2021-06-23

## 2021-06-23 RX ORDER — BUDESONIDE 2 MG/1
2 AEROSOL, FOAM RECTAL 2 TIMES DAILY
Qty: 133.6 G | Refills: 1 | Status: SHIPPED | OUTPATIENT
Start: 2021-06-23 | End: 2022-01-12

## 2021-07-07 ENCOUNTER — DOCUMENTATION ONLY (OUTPATIENT)
Dept: GASTROENTEROLOGY | Facility: CLINIC | Age: 31
End: 2021-07-07

## 2021-08-04 ENCOUNTER — TELEPHONE (OUTPATIENT)
Dept: GASTROENTEROLOGY | Facility: CLINIC | Age: 31
End: 2021-08-04

## 2021-09-09 NOTE — TELEPHONE ENCOUNTER
----- Message from Miguelina Marlow sent at 11/13/2020  3:07 PM CST -----  Contact: Kalie @ [phone]  Kalie from [facility] would like to speak with staff regarding continued orders for Entyvio for Pt. Please follow up with Kalie for further assistance.       Contact Info: Kalie @ [phone]    
Called and spoke with Kalie  Explained we are increasing Entyvio to Q 4 Weeks  I am waiting on Dr Chau to sign the clinic note then we will be faxing everything over   
[employer]

## 2021-10-14 ENCOUNTER — PATIENT MESSAGE (OUTPATIENT)
Dept: GASTROENTEROLOGY | Facility: CLINIC | Age: 31
End: 2021-10-14
Payer: COMMERCIAL

## 2021-11-26 ENCOUNTER — TELEPHONE (OUTPATIENT)
Dept: GASTROENTEROLOGY | Facility: CLINIC | Age: 31
End: 2021-11-26
Payer: COMMERCIAL

## 2022-01-04 ENCOUNTER — TELEPHONE (OUTPATIENT)
Dept: GASTROENTEROLOGY | Facility: CLINIC | Age: 32
End: 2022-01-04
Payer: COMMERCIAL

## 2022-01-04 NOTE — TELEPHONE ENCOUNTER
Unable lvm pt needs labs and f/u appointment. Needs update clinical (Entyvio) faxed to [facility] Attn Meryl [phone]. Reminder set to f/u

## 2022-01-07 ENCOUNTER — TELEPHONE (OUTPATIENT)
Dept: GASTROENTEROLOGY | Facility: CLINIC | Age: 32
End: 2022-01-07
Payer: COMMERCIAL

## 2022-01-07 NOTE — TELEPHONE ENCOUNTER
----- Message from Tanvi Maria sent at 1/7/2022  2:47 PM CST -----  Regarding: Appointment  Contact: [phone]  Calling to schedule an appointment. Please call and schedule

## 2022-01-10 ENCOUNTER — PATIENT MESSAGE (OUTPATIENT)
Dept: GASTROENTEROLOGY | Facility: CLINIC | Age: 32
End: 2022-01-10
Payer: COMMERCIAL

## 2022-01-10 NOTE — TELEPHONE ENCOUNTER
Spoke with patient and changed labs to [facility] 2nd floor lab at 2:30.  Also scheduled follow up appointment with Dr. Chau on 1/12 at 9:00 am.

## 2022-01-11 ENCOUNTER — LAB VISIT (OUTPATIENT)
Dept: LAB | Facility: HOSPITAL | Age: 32
End: 2022-01-11
Attending: INTERNAL MEDICINE
Payer: COMMERCIAL

## 2022-01-11 DIAGNOSIS — K51.00 ULCERATIVE PANCOLITIS WITHOUT COMPLICATION: ICD-10-CM

## 2022-01-11 DIAGNOSIS — D84.9 IMMUNOSUPPRESSION: ICD-10-CM

## 2022-01-11 LAB
ALBUMIN SERPL BCP-MCNC: 4.1 G/DL (ref 3.5–5.2)
ALP SERPL-CCNC: 48 U/L (ref 55–135)
ALT SERPL W/O P-5'-P-CCNC: 22 U/L (ref 10–44)
ANION GAP SERPL CALC-SCNC: 10 MMOL/L (ref 8–16)
AST SERPL-CCNC: 29 U/L (ref 10–40)
BASOPHILS # BLD AUTO: 0.04 K/UL (ref 0–0.2)
BASOPHILS NFR BLD: 0.5 % (ref 0–1.9)
BILIRUB SERPL-MCNC: 1.1 MG/DL (ref 0.1–1)
BUN SERPL-MCNC: 9 MG/DL (ref 6–20)
CALCIUM SERPL-MCNC: 9.9 MG/DL (ref 8.7–10.5)
CHLORIDE SERPL-SCNC: 100 MMOL/L (ref 95–110)
CO2 SERPL-SCNC: 23 MMOL/L (ref 23–29)
CREAT SERPL-MCNC: 0.8 MG/DL (ref 0.5–1.4)
DIFFERENTIAL METHOD: ABNORMAL
EOSINOPHIL # BLD AUTO: 0.1 K/UL (ref 0–0.5)
EOSINOPHIL NFR BLD: 1.3 % (ref 0–8)
ERYTHROCYTE [DISTWIDTH] IN BLOOD BY AUTOMATED COUNT: 11.7 % (ref 11.5–14.5)
EST. GFR  (AFRICAN AMERICAN): >60 ML/MIN/1.73 M^2
EST. GFR  (NON AFRICAN AMERICAN): >60 ML/MIN/1.73 M^2
GLUCOSE SERPL-MCNC: 110 MG/DL (ref 70–110)
HCT VFR BLD AUTO: 39.5 % (ref 37–48.5)
HGB BLD-MCNC: 14 G/DL (ref 12–16)
IMM GRANULOCYTES # BLD AUTO: 0.01 K/UL (ref 0–0.04)
IMM GRANULOCYTES NFR BLD AUTO: 0.1 % (ref 0–0.5)
LYMPHOCYTES # BLD AUTO: 2.2 K/UL (ref 1–4.8)
LYMPHOCYTES NFR BLD: 29.2 % (ref 18–48)
MCH RBC QN AUTO: 32.3 PG (ref 27–31)
MCHC RBC AUTO-ENTMCNC: 35.4 G/DL (ref 32–36)
MCV RBC AUTO: 91 FL (ref 82–98)
MONOCYTES # BLD AUTO: 0.5 K/UL (ref 0.3–1)
MONOCYTES NFR BLD: 7.1 % (ref 4–15)
NEUTROPHILS # BLD AUTO: 4.6 K/UL (ref 1.8–7.7)
NEUTROPHILS NFR BLD: 61.8 % (ref 38–73)
NRBC BLD-RTO: 0 /100 WBC
PLATELET # BLD AUTO: 324 K/UL (ref 150–450)
PMV BLD AUTO: 9.6 FL (ref 9.2–12.9)
POTASSIUM SERPL-SCNC: 3.7 MMOL/L (ref 3.5–5.1)
PROT SERPL-MCNC: 7.6 G/DL (ref 6–8.4)
RBC # BLD AUTO: 4.33 M/UL (ref 4–5.4)
SODIUM SERPL-SCNC: 133 MMOL/L (ref 136–145)
WBC # BLD AUTO: 7.5 K/UL (ref 3.9–12.7)

## 2022-01-11 PROCEDURE — 87340 HEPATITIS B SURFACE AG IA: CPT | Performed by: INTERNAL MEDICINE

## 2022-01-11 PROCEDURE — 86480 TB TEST CELL IMMUN MEASURE: CPT | Performed by: INTERNAL MEDICINE

## 2022-01-11 PROCEDURE — 86704 HEP B CORE ANTIBODY TOTAL: CPT | Performed by: INTERNAL MEDICINE

## 2022-01-11 PROCEDURE — 85025 COMPLETE CBC W/AUTO DIFF WBC: CPT | Performed by: INTERNAL MEDICINE

## 2022-01-11 PROCEDURE — 80053 COMPREHEN METABOLIC PANEL: CPT | Performed by: INTERNAL MEDICINE

## 2022-01-11 PROCEDURE — 36415 COLL VENOUS BLD VENIPUNCTURE: CPT | Performed by: INTERNAL MEDICINE

## 2022-01-12 ENCOUNTER — OFFICE VISIT (OUTPATIENT)
Dept: GASTROENTEROLOGY | Facility: CLINIC | Age: 32
End: 2022-01-12
Payer: COMMERCIAL

## 2022-01-12 VITALS
DIASTOLIC BLOOD PRESSURE: 92 MMHG | SYSTOLIC BLOOD PRESSURE: 133 MMHG | BODY MASS INDEX: 22.05 KG/M2 | WEIGHT: 132.5 LBS | OXYGEN SATURATION: 98 % | TEMPERATURE: 98 F | HEART RATE: 78 BPM

## 2022-01-12 DIAGNOSIS — K51.00 ULCERATIVE PANCOLITIS WITHOUT COMPLICATION: Primary | ICD-10-CM

## 2022-01-12 LAB
HBV CORE AB SERPL QL IA: NEGATIVE
HBV SURFACE AG SERPL QL IA: NEGATIVE

## 2022-01-12 PROCEDURE — 1159F PR MEDICATION LIST DOCUMENTED IN MEDICAL RECORD: ICD-10-PCS | Mod: CPTII,S$GLB,, | Performed by: INTERNAL MEDICINE

## 2022-01-12 PROCEDURE — 1159F MED LIST DOCD IN RCRD: CPT | Mod: CPTII,S$GLB,, | Performed by: INTERNAL MEDICINE

## 2022-01-12 PROCEDURE — 3075F SYST BP GE 130 - 139MM HG: CPT | Mod: CPTII,S$GLB,, | Performed by: INTERNAL MEDICINE

## 2022-01-12 PROCEDURE — 99213 OFFICE O/P EST LOW 20 MIN: CPT | Mod: S$GLB,,, | Performed by: INTERNAL MEDICINE

## 2022-01-12 PROCEDURE — 3080F PR MOST RECENT DIASTOLIC BLOOD PRESSURE >= 90 MM HG: ICD-10-PCS | Mod: CPTII,S$GLB,, | Performed by: INTERNAL MEDICINE

## 2022-01-12 PROCEDURE — 99213 PR OFFICE/OUTPT VISIT, EST, LEVL III, 20-29 MIN: ICD-10-PCS | Mod: S$GLB,,, | Performed by: INTERNAL MEDICINE

## 2022-01-12 PROCEDURE — 3080F DIAST BP >= 90 MM HG: CPT | Mod: CPTII,S$GLB,, | Performed by: INTERNAL MEDICINE

## 2022-01-12 PROCEDURE — 3008F PR BODY MASS INDEX (BMI) DOCUMENTED: ICD-10-PCS | Mod: CPTII,S$GLB,, | Performed by: INTERNAL MEDICINE

## 2022-01-12 PROCEDURE — 3075F PR MOST RECENT SYSTOLIC BLOOD PRESS GE 130-139MM HG: ICD-10-PCS | Mod: CPTII,S$GLB,, | Performed by: INTERNAL MEDICINE

## 2022-01-12 PROCEDURE — 1160F RVW MEDS BY RX/DR IN RCRD: CPT | Mod: CPTII,S$GLB,, | Performed by: INTERNAL MEDICINE

## 2022-01-12 PROCEDURE — 3008F BODY MASS INDEX DOCD: CPT | Mod: CPTII,S$GLB,, | Performed by: INTERNAL MEDICINE

## 2022-01-12 PROCEDURE — 1160F PR REVIEW ALL MEDS BY PRESCRIBER/CLIN PHARMACIST DOCUMENTED: ICD-10-PCS | Mod: CPTII,S$GLB,, | Performed by: INTERNAL MEDICINE

## 2022-01-12 RX ORDER — CETIRIZINE HYDROCHLORIDE 10 MG/1
10 TABLET ORAL ONCE
Status: CANCELLED | OUTPATIENT
Start: 2022-01-12 | End: 2022-01-12

## 2022-01-12 RX ORDER — ACETAMINOPHEN 325 MG/1
650 TABLET ORAL ONCE
Status: CANCELLED | OUTPATIENT
Start: 2022-01-12 | End: 2022-01-12

## 2022-01-12 NOTE — PATIENT INSTRUCTIONS
1. Restart Entyvio  2. After restarting (2 doses) can decrease Canasa to one every other day for a month then stop if doing well  3. Follow up in 6 months

## 2022-01-12 NOTE — PROGRESS NOTES
[facility]          Inflammatory Bowel Disease Follow Up Note         TODAY'S VISIT DATE:  1/12/2022    Reason for Consult:    Chief Complaint   Patient presents with    Ulcerative Colitis       PCP: Thais Alanis      Referring MD:   No ref. provider found    History of Present Illness:  Prudence Castaneda who is a 31 y.o. female is being seen today at the [facility] on 01/12/2022 for inflammatory bowel disease- ulcerative colitis.   First time seeing her in almost a year.  She reports that she has been doing fairly well.  She has been taking Entyvio every 4 weeks.  She also continues to use Canasa suppositories once daily.  She does note that she is a few weeks late for her Entyvio infusion and needs to get this rescheduled as she is starting to have some abdominal cramping that she thinks is because of the missed dose.  She denies any fevers or chills.  She has no blood in the stools.  Her stools normal.  She did have COVID before [facility] but has recovered from this.    IBD History:  She was diagnosed with ulcerative colitis in 2017.  At that time she was having bloody diarrhea as well as mucus in the stools.  She underwent a colonoscopy that revealed endoscopically active disease only involving the distal rectum although biopsies showed chronic inflammatory changes throughout the colon.  She was placed on Canasa suppositories which helped to some degree.  She was also treated with sulfasalazine.  I met her about a year ago because of ongoing symptoms.  At that time she was taking Apriso and Canasa.  She was having about 6-7 loose bowel movements a day in spite of taking medications.  Because of insurance issues there was some delay in getting her scoped to reassess her disease activity.  In September of last year she underwent a colonoscopy as part of the pre enrollment evaluation for clinical trial for mirikizumab and she was found to have  active inflammation (Cooper 2) in the distal 10 cm of the rectum but no endoscopically visible disease proximal to this.  Biopsies of the rectum showed chronic active inflammation but biopsies from the remainder of the colon did not show any evidence of inflammation.  She was taking Apriso at that time but was not taking Canasa.  She did not qualify for enrollment in the clinical trial and after discussion of treatment options she elected to start Entyvio.  She started this around October or November of last year.  She has continued to take Apriso and Canasa along with this.  Entyvio was increased to every 4 weeks in December of 2020 because of a low level in ongoing symptoms.  Apriso was stopped.  Since increasing the dose of Entyvio she has had a significant improvement.    IBD Details:  Dx Date:  2017  Disease type/distribution:  Ulcerative colitis/endoscopically active disease in the distal rectum but biopsies with pan colonic chronic inflammatory changes  Current Treatment:  Canasa/Entyvio every 4 weeks Start Date:  Fall 2019/dose increase in December 2020 Response:  Good  Optimized:  Yes Adverse reactions:  None  Prior surgeries:  None  CRP Elevation:  Yes-calprotectin will be abnormal during active disease as well  Disease Complications:  None  Extraintestinal manifestations:  None  Prior treatments:   Steroids:  None  5ASA:  Incomplete response  IMM:  None  TNF Inh:  None   Anti-Integrin:  Currently taking   IL 12/23:  None  NICOLE Inh:  None    Previous Clinical Trials:  Attempted enrollment-did not qualify because of limited disease distribution.    Last Colonoscopy:  September 2019-proctitis involving the last 10 cm of the rectum.  Biopsy showed no active inflammation in the remainder of her colon.    Other Endoscopies:  None    Imaging:   MRE:  None   CT:  None   Other:  None    Pertinent Labs:  Lab Results   Component Value Date    SEDRATE 0 05/01/2018    CRP 2.6 09/21/2020     No results found for: TTGIGA,  IGA  No results found for: TSH, FREET4  No results found for: CYXRIAYD85UG, BJCCJGZQ67  Lab Results   Component Value Date    HEPBSAG Negative 01/11/2022    HEPBCAB Negative 01/11/2022     No results found for: [id number]  No results found for: NIL, TBAG, TBAGNIL, MITOGENNIL, TBGOLD, TSPOTSCREN  No results found for: TPTMINTERP, TPMTRESULT  No results found for: STOOLCULTURE, RXIGWKEFKC7R, GLYYCXQJOE7O, CDIFFICILEAN, CDIFFTOX, CDIFFICILEBY  Lab Results   Component Value Date    CALPROTECTIN <27.1 09/21/2020       Therapeutic Drug Monitoring Labs:  No results found for: PROMETH  No results found for: ANSADAINIT, INFLIXIMAB, INFLIXINTERP    Vaccinations:  No results found for: HEPBSAB  No results found for: HEPAIGG  No results found for: VARICELLAZOS, VARICELLAINT  Immunization History   Administered Date(s) Administered    COVID-19, MRNA, LN-S, PF (MODERNA FULL 0.5 ML DOSE) 03/29/2021, 04/26/2021    Influenza - Quadrivalent - PF *Preferred* (6 months and older) 02/06/2020    Influenza - Trivalent (ADULT) 01/09/2015    Influenza Split 01/09/2015    Tdap 02/13/2020         Review of Systems  Review of Systems   Constitutional: Negative for chills, fever and weight loss.   HENT: Negative for sore throat.    Eyes: Negative for pain, discharge and redness.   Respiratory: Negative for cough, shortness of breath and wheezing.    Cardiovascular: Negative for chest pain, orthopnea and leg swelling.   Gastrointestinal: Positive for abdominal pain. Negative for blood in stool, constipation, diarrhea, heartburn, melena, nausea and vomiting.   Genitourinary: Negative for dysuria, frequency and urgency.   Musculoskeletal: Negative for back pain, joint pain and myalgias.   Skin: Negative for itching and rash.   Neurological: Negative for focal weakness and seizures.   Endo/Heme/Allergies: Does not bruise/bleed easily.   Psychiatric/Behavioral: Negative for depression. The patient is not nervous/anxious.        Medical History:    Past Medical History:   Diagnosis Date    Ulcerative colitis        Surgical History:  Past Surgical History:   Procedure Laterality Date    APPENDECTOMY      COLONOSCOPY         Family History:   Family History   Problem Relation Age of Onset    Glaucoma Mother     No Known Problems Father     No Known Problems Brother     No Known Problems Brother        Social History:   Social History     Tobacco Use    Smoking status: Never Smoker    Smokeless tobacco: Never Used   Substance Use Topics    Alcohol use: Yes     Alcohol/week: 3.0 standard drinks     Types: 3 Glasses of wine per week     Comment: per week     Drug use: No       Allergies: Reviewed    Home Medications:   Medication List with Changes/Refills   Current Medications    LEVONORGESTREL-ETHINYL ESTRADIOL (SEASONALE) 0.15 MG-30 MCG (91) PER TABLET    TAKE 1 TABLET BY MOUTH EVERY DAY    MESALAMINE (CANASA) 1000 MG SUPP    PLACE 1 SUPPOSITORY (1,000 MG TOTAL) RECTALLY NIGHTLY.    MULTIVITAMIN (THERAGRAN) PER TABLET    Take 1 tablet by mouth once daily.    SPIRONOLACTONE (ALDACTONE) 50 MG TABLET    Take 50 mg by mouth once daily.    VEDOLIZUMAB (ENTYVIO IV)    Inject into the vein every 28 days.    Discontinued Medications    BUDESONIDE (UCERIS) 2 MG/ACTUATION FOAM    Place 2 mg rectally 2 (two) times a day. 1 pump twice daily for 2 weeks then 1 pump every night for 4 weeks    ERGOCALCIFEROL, VITAMIN D2, (VITAMIN D ORAL)    Take by mouth.    MESALAMINE (APRISO) 0.375 GRAM CP24    Take 4 capsules (1.5 g total) by mouth once daily.       Physical Exam:  Vital Signs:  BP (!) 133/92 (BP Location: Left arm, Patient Position: Sitting)   Pulse 78   Temp 97.9 °F (36.6 °C)   Wt 60.1 kg (132 lb 7.9 oz)   LMP 11/03/2021   SpO2 98%   BMI 22.05 kg/m²   Body mass index is 22.05 kg/m².    Physical Exam  Vitals and nursing note reviewed.   Constitutional:       General: She is not in acute distress.     Appearance: Normal appearance. She is well-developed.  She is not ill-appearing or toxic-appearing.   Eyes:      Conjunctiva/sclera: Conjunctivae normal.      Pupils: Pupils are equal, round, and reactive to light.   Neck:      Thyroid: No thyromegaly.   Cardiovascular:      Rate and Rhythm: Normal rate and regular rhythm.      Heart sounds: Normal heart sounds. No murmur heard.      Pulmonary:      Effort: Pulmonary effort is normal.      Breath sounds: Normal breath sounds. No wheezing or rales.   Abdominal:      General: Bowel sounds are normal. There is no distension.      Palpations: Abdomen is soft. There is no mass.      Tenderness: There is no abdominal tenderness.   Musculoskeletal:         General: No tenderness. Normal range of motion.   Lymphadenopathy:      Cervical: No cervical adenopathy.   Skin:     Findings: No erythema or rash.   Neurological:      General: No focal deficit present.      Mental Status: She is alert and oriented to person, place, and time.   Psychiatric:         Mood and Affect: Mood normal.         Behavior: Behavior normal.         Thought Content: Thought content normal.         Judgment: Judgment normal.       Telemedicine visit. Remainder of physical unable to be completed.    Labs: reviewed and pertinent noted above    Assessment/Plan:  Prudence Castaneda is a 31 y.o. female with ulcerative colitis with disease predominantly in the distal rectum. 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  Ulcerative colitis:  She continues to do pretty well.  She is starting to have some abdominal cramping that she attributes to missing her dose of Entyvio.  We will plan to get this restarted in the near future.  Continue Canasa suppository daily for now.  Once she has received 2 doses of Entyvio she can try to decrease the Canasa suppositories to every other day and then try to stop it after a month if she continues to feel well.  Recent labs were all normal.  Continue to  monitor.      # IBD specific health maintenance:  Colon cancer surveillance:  Up-to-date    Annual:  - Eye exam:  Review in the future  - Skin exam (if on IMM/TNF):  Not applicable  - reminded pt to use sunblock/hats/sunprotective clothing  - PAP (if immunosuppressed):  Up-to-date    DEXA:  Not applicable    Vitamin D:  Previously on supplementation-recheck when possible    Vaccines:    Vaccines are up-to-date-recommend COVID booster and flu shot today.    Follow up: Follow up in about 6 months (around 7/12/2022).    Thank you again for sending Prudence Tonya to see Dr. Sung Chau today at the [facility]. Please don't hesitate to contact Dr. Chau if there are any questions regarding this evaluation, or if you have any other patients with inflammatory bowel disease for whom you would like a consultation. You can reach Dr. Chau at [phone] or by email at [email or web address]    Jason Chau MD  Department of Gastroenterology  Inflammatory Bowel Disease    \

## 2022-01-13 LAB
GAMMA INTERFERON BACKGROUND BLD IA-ACNC: 0.03 IU/ML
M TB IFN-G CD4+ BCKGRND COR BLD-ACNC: 0 IU/ML
MITOGEN IGNF BCKGRD COR BLD-ACNC: >10 IU/ML
TB GOLD PLUS: NEGATIVE
TB2 - NIL: 0 IU/ML

## 2022-02-18 ENCOUNTER — TELEPHONE (OUTPATIENT)
Dept: GASTROENTEROLOGY | Facility: CLINIC | Age: 32
End: 2022-02-18
Payer: COMMERCIAL

## 2022-03-28 ENCOUNTER — TELEPHONE (OUTPATIENT)
Dept: GASTROENTEROLOGY | Facility: CLINIC | Age: 32
End: 2022-03-28
Payer: COMMERCIAL

## 2022-04-21 ENCOUNTER — PATIENT MESSAGE (OUTPATIENT)
Dept: OBSTETRICS AND GYNECOLOGY | Facility: CLINIC | Age: 32
End: 2022-04-21
Payer: COMMERCIAL

## 2022-04-21 ENCOUNTER — PATIENT MESSAGE (OUTPATIENT)
Dept: GASTROENTEROLOGY | Facility: CLINIC | Age: 32
End: 2022-04-21
Payer: COMMERCIAL

## 2022-04-21 RX ORDER — LEVONORGESTREL AND ETHINYL ESTRADIOL 0.15-0.03
1 KIT ORAL DAILY
Qty: 91 TABLET | Refills: 0 | Status: SHIPPED | OUTPATIENT
Start: 2022-04-21 | End: 2023-07-31

## 2022-04-21 NOTE — TELEPHONE ENCOUNTER
Phoebe, you saw this pt in the [facility] 4/1/2021.  She has not established care with Dr. Yap yet, her previous OBGYN moved out of state and she needs refills of seasonique before her appt.  Can you fill her rx?

## 2022-05-11 ENCOUNTER — TELEPHONE (OUTPATIENT)
Dept: GASTROENTEROLOGY | Facility: CLINIC | Age: 32
End: 2022-05-11
Payer: COMMERCIAL

## 2022-05-30 ENCOUNTER — TELEPHONE (OUTPATIENT)
Dept: GASTROENTEROLOGY | Facility: CLINIC | Age: 32
End: 2022-05-30
Payer: COMMERCIAL

## 2022-07-14 ENCOUNTER — TELEPHONE (OUTPATIENT)
Dept: GASTROENTEROLOGY | Facility: CLINIC | Age: 32
End: 2022-07-14
Payer: COMMERCIAL

## 2022-07-22 ENCOUNTER — TELEPHONE (OUTPATIENT)
Dept: GASTROENTEROLOGY | Facility: CLINIC | Age: 32
End: 2022-07-22
Payer: COMMERCIAL

## 2022-11-14 ENCOUNTER — OFFICE VISIT (OUTPATIENT)
Dept: GASTROENTEROLOGY | Facility: CLINIC | Age: 32
End: 2022-11-14
Payer: COMMERCIAL

## 2022-11-14 VITALS
BODY MASS INDEX: 23.83 KG/M2 | DIASTOLIC BLOOD PRESSURE: 84 MMHG | HEIGHT: 65 IN | WEIGHT: 143.06 LBS | HEART RATE: 86 BPM | OXYGEN SATURATION: 98 % | SYSTOLIC BLOOD PRESSURE: 119 MMHG | TEMPERATURE: 98 F

## 2022-11-14 DIAGNOSIS — K51.00 ULCERATIVE PANCOLITIS WITHOUT COMPLICATION: Primary | ICD-10-CM

## 2022-11-14 PROCEDURE — 1160F RVW MEDS BY RX/DR IN RCRD: CPT | Mod: CPTII,S$GLB,, | Performed by: INTERNAL MEDICINE

## 2022-11-14 PROCEDURE — 1159F PR MEDICATION LIST DOCUMENTED IN MEDICAL RECORD: ICD-10-PCS | Mod: CPTII,S$GLB,, | Performed by: INTERNAL MEDICINE

## 2022-11-14 PROCEDURE — 3074F PR MOST RECENT SYSTOLIC BLOOD PRESSURE < 130 MM HG: ICD-10-PCS | Mod: CPTII,S$GLB,, | Performed by: INTERNAL MEDICINE

## 2022-11-14 PROCEDURE — 3074F SYST BP LT 130 MM HG: CPT | Mod: CPTII,S$GLB,, | Performed by: INTERNAL MEDICINE

## 2022-11-14 PROCEDURE — 1160F PR REVIEW ALL MEDS BY PRESCRIBER/CLIN PHARMACIST DOCUMENTED: ICD-10-PCS | Mod: CPTII,S$GLB,, | Performed by: INTERNAL MEDICINE

## 2022-11-14 PROCEDURE — 3008F PR BODY MASS INDEX (BMI) DOCUMENTED: ICD-10-PCS | Mod: CPTII,S$GLB,, | Performed by: INTERNAL MEDICINE

## 2022-11-14 PROCEDURE — 1159F MED LIST DOCD IN RCRD: CPT | Mod: CPTII,S$GLB,, | Performed by: INTERNAL MEDICINE

## 2022-11-14 PROCEDURE — 3079F DIAST BP 80-89 MM HG: CPT | Mod: CPTII,S$GLB,, | Performed by: INTERNAL MEDICINE

## 2022-11-14 PROCEDURE — 99213 OFFICE O/P EST LOW 20 MIN: CPT | Mod: S$GLB,,, | Performed by: INTERNAL MEDICINE

## 2022-11-14 PROCEDURE — 99213 PR OFFICE/OUTPT VISIT, EST, LEVL III, 20-29 MIN: ICD-10-PCS | Mod: S$GLB,,, | Performed by: INTERNAL MEDICINE

## 2022-11-14 PROCEDURE — 3008F BODY MASS INDEX DOCD: CPT | Mod: CPTII,S$GLB,, | Performed by: INTERNAL MEDICINE

## 2022-11-14 PROCEDURE — 3079F PR MOST RECENT DIASTOLIC BLOOD PRESSURE 80-89 MM HG: ICD-10-PCS | Mod: CPTII,S$GLB,, | Performed by: INTERNAL MEDICINE

## 2022-11-14 RX ORDER — MULTIVITAMIN
1 TABLET ORAL DAILY
COMMUNITY

## 2022-11-14 NOTE — PROGRESS NOTES
"IBD PATIENT INTAKE:    COVID symptoms in the last 7 days (runny nose, sore throat, congestion, cough, fever): No  PCP: Primary Doctor No  If not PCP-  number given to establish [phone]: Yes    ALLERGIES REVIEWED:  Yes    CHIEF COMPLAINT:    Chief Complaint   Patient presents with    Ulcerative Colitis         VITAL SIGNS:  /84 (BP Location: Left arm, Patient Position: Sitting)   Pulse 86   Temp 97.7 °F (36.5 °C)   Ht 5' 5" (1.651 m)   Wt 64.9 kg (143 lb 1.3 oz)   SpO2 98%   BMI 23.81 kg/m²      Change in medical, surgical, family or social history: No    IBD THERAPY (name, dose/frequency):  Entyvio Q 28 days   Last dose:  11/10/22    Next dose:  12/8/2022  Infusion/Pharmacy: [facility]    NSAIDs (aspirin, ibuprofen-advil or motrin, naproxen-aleve, diclofenac-voltaren, BC powder, excedrin, goodies): No    Alternative/Complementary Medications (i.e. probiotics, turmeric, fish oil, aloe vera):      no  Name/dose:  none    Vitamins:   Vit D:  no     Vit B-12:  no   Folic Acid: no       Calcium: no     Iron:  no      MVI: yes    Antibiotics (past 30 Days):  no  If yes   Indication:  Name of antibiotic:  Completion date:     REVIEWED MEDICATION LIST RECONCILED INCLUDING ABOVE MEDS:  yes                    "

## 2022-11-14 NOTE — PROGRESS NOTES
[facility]          Inflammatory Bowel Disease Follow Up Note         TODAY'S VISIT DATE:  11/14/2022    Reason for Consult:    Chief Complaint   Patient presents with    Ulcerative Colitis       PCP: Primary Doctor No      Referring MD:   No ref. provider found    History of Present Illness:  Prudence Castaneda who is a 32 y.o. female is being seen today at the [facility] on 11/14/2022 for inflammatory bowel disease- ulcerative colitis.   She is doing pretty well.  She has not had any flares of her proctitis.  She feels like everything has been very stable.  She has definitely noticed a significant improvement since being on Entyvio.  She continues to take this every 4 weeks.  She does feel like her symptoms do increase a little bit before her next dose but overall she is doing quite well.  She continues to take Canasa suppositories 2 but she has missed a few of these over the last few months and did not notice any significant worsening of her symptoms during that time.  She denies any other complaints today.    IBD History:  She was diagnosed with ulcerative colitis in 2017.  At that time she was having bloody diarrhea as well as mucus in the stools.  She underwent a colonoscopy that revealed endoscopically active disease only involving the distal rectum although biopsies showed chronic inflammatory changes throughout the colon.  She was placed on Canasa suppositories which helped to some degree.  She was also treated with sulfasalazine.  I met her about a year ago because of ongoing symptoms.  At that time she was taking Apriso and Canasa.  She was having about 6-7 loose bowel movements a day in spite of taking medications.  Because of insurance issues there was some delay in getting her scoped to reassess her disease activity.  In September of last year she underwent a colonoscopy as part of the pre enrollment evaluation for clinical trial for  mirikizumab and she was found to have active inflammation (Cooper 2) in the distal 10 cm of the rectum but no endoscopically visible disease proximal to this.  Biopsies of the rectum showed chronic active inflammation but biopsies from the remainder of the colon did not show any evidence of inflammation.  She was taking Apriso at that time but was not taking Canasa.  She did not qualify for enrollment in the clinical trial and after discussion of treatment options she elected to start Entyvio.  She started this around October or November of last year.  She has continued to take Apriso and Canasa along with this.  Entyvio was increased to every 4 weeks in December of 2020 because of a low level in ongoing symptoms.  Apriso was stopped.  Since increasing the dose of Entyvio she has had a significant improvement.    IBD Details:  Dx Date:  2017  Disease type/distribution:  Ulcerative colitis/endoscopically active disease in the distal rectum but biopsies with pan colonic chronic inflammatory changes  Current Treatment:  Canasa/Entyvio every 4 weeks Start Date:  Fall 2019/dose increase in December 2020 Response:  Good  Optimized:  Yes Adverse reactions:  None  Prior surgeries:  None  CRP Elevation:  Yes-calprotectin will be abnormal during active disease as well  Disease Complications:  None  Extraintestinal manifestations:  None  Prior treatments:   Steroids:  None  5ASA:  Incomplete response  IMM:  None  TNF Inh:  None   Anti-Integrin:  Currently taking   IL 12/23:  None  NICOLE Inh:  None    Previous Clinical Trials:  Attempted enrollment-did not qualify because of limited disease distribution.    Last Colonoscopy:  September 2019-proctitis involving the last 10 cm of the rectum.  Biopsy showed no active inflammation in the remainder of her colon.    Other Endoscopies:  None    Imaging:   MRE:  None   CT:  None   Other:  None    Pertinent Labs:  Lab Results   Component Value Date    SEDRATE 0 05/01/2018    CRP 2.6  09/21/2020     No results found for: TTGIGA, IGA  No results found for: TSH, FREET4  No results found for: ESNTRRZY50IW, JSPQZWSU01  Lab Results   Component Value Date    HEPBSAG Negative 01/11/2022    HEPBCAB Negative 01/11/2022     No results found for: SAO80GABG  Lab Results   Component Value Date    NIL 0.030 01/11/2022    MITOGENNIL >10.000 01/11/2022     No results found for: TPTMINTERP, TPMTRESULT  No results found for: STOOLCULTURE, RCXLSZRLZR6I, HXNIXJNKML7Y, CDIFFICILEAN, CDIFFTOX, CDIFFICILEBY  Lab Results   Component Value Date    CALPROTECTIN <27.1 09/21/2020       Therapeutic Drug Monitoring Labs:  No results found for: PROMETH  No results found for: ANSADAINIT, INFLIXIMAB, INFLIXINTERP    Vaccinations:  No results found for: HEPBSAB  No results found for: HEPAIGG  No results found for: VARICELLAZOS, VARICELLAINT  Immunization History   Administered Date(s) Administered    COVID-19, MRNA, LN-S, PF (MODERNA FULL 0.5 ML DOSE) 03/29/2021, 04/26/2021    Influenza - Quadrivalent - PF *Preferred* (6 months and older) 02/06/2020, 10/13/2022    Influenza - Trivalent (ADULT) 01/09/2015    Influenza Split 01/09/2015    Tdap 02/13/2020         Review of Systems  Review of Systems   Constitutional:  Negative for chills, fever and weight loss.   HENT:  Negative for sore throat.    Eyes:  Negative for pain, discharge and redness.   Respiratory:  Negative for cough, shortness of breath and wheezing.    Cardiovascular:  Negative for chest pain, orthopnea and leg swelling.   Gastrointestinal:  Negative for abdominal pain, blood in stool, constipation, diarrhea, heartburn, melena, nausea and vomiting.   Genitourinary:  Negative for dysuria, frequency and urgency.   Musculoskeletal:  Negative for back pain, joint pain and myalgias.   Skin:  Negative for itching and rash.   Neurological:  Negative for focal weakness and seizures.   Endo/Heme/Allergies:  Does not bruise/bleed easily.   Psychiatric/Behavioral:  Negative for  "depression. The patient is not nervous/anxious.      Medical History:   Past Medical History:   Diagnosis Date    Ulcerative colitis        Surgical History:  Past Surgical History:   Procedure Laterality Date    APPENDECTOMY      COLONOSCOPY         Family History:   Family History   Problem Relation Age of Onset    Glaucoma Mother     No Known Problems Father     No Known Problems Brother     No Known Problems Brother        Social History:   Social History     Tobacco Use    Smoking status: Never    Smokeless tobacco: Never   Substance Use Topics    Alcohol use: Yes     Alcohol/week: 3.0 standard drinks     Types: 3 Glasses of wine per week     Comment: per week     Drug use: No       Allergies: Reviewed    Home Medications:   Medication List with Changes/Refills   Current Medications    LEVONORGESTREL-ETHINYL ESTRADIOL (SEASONALE) 0.15 MG-30 MCG (91) PER TABLET    Take 1 tablet by mouth once daily.    MESALAMINE (CANASA) 1000 MG SUPP    PLACE 1 SUPPOSITORY (1,000 MG TOTAL) RECTALLY NIGHTLY.    MULTIVITAMIN (ONE DAILY MULTIVITAMIN) PER TABLET    Take 1 tablet by mouth once daily.    SPIRONOLACTONE (ALDACTONE) 50 MG TABLET    Take 50 mg by mouth once daily.    VEDOLIZUMAB (ENTYVIO IV)    Inject into the vein every 28 days.    Discontinued Medications    BENZONATATE (TESSALON) 100 MG CAPSULE    Take 100-200 mg by mouth 3 (three) times daily as needed.    DAYSEE 0.15 MG-30 MCG (84)/10 MCG (7) 3MPK    Take 1 tablet by mouth once daily.    MULTIVITAMIN (THERAGRAN) PER TABLET    Take 1 tablet by mouth once daily.       Physical Exam:  Vital Signs:  /84 (BP Location: Left arm, Patient Position: Sitting)   Pulse 86   Temp 97.7 °F (36.5 °C)   Ht 5' 5" (1.651 m)   Wt 64.9 kg (143 lb 1.3 oz)   SpO2 98%   BMI 23.81 kg/m²   Body mass index is 23.81 kg/m².    Physical Exam  Vitals and nursing note reviewed.   Constitutional:       General: She is not in acute distress.     Appearance: Normal appearance. She is " well-developed. She is not ill-appearing or toxic-appearing.   Eyes:      Conjunctiva/sclera: Conjunctivae normal.      Pupils: Pupils are equal, round, and reactive to light.   Neck:      Thyroid: No thyromegaly.   Cardiovascular:      Rate and Rhythm: Normal rate and regular rhythm.      Heart sounds: Normal heart sounds. No murmur heard.  Pulmonary:      Effort: Pulmonary effort is normal.      Breath sounds: Normal breath sounds. No wheezing or rales.   Abdominal:      General: Bowel sounds are normal. There is no distension.      Palpations: Abdomen is soft. There is no mass.      Tenderness: There is no abdominal tenderness.   Musculoskeletal:         General: No tenderness. Normal range of motion.   Lymphadenopathy:      Cervical: No cervical adenopathy.   Skin:     Findings: No erythema or rash.   Neurological:      General: No focal deficit present.      Mental Status: She is alert and oriented to person, place, and time.   Psychiatric:         Mood and Affect: Mood normal.         Behavior: Behavior normal.         Thought Content: Thought content normal.         Judgment: Judgment normal.     Telemedicine visit. Remainder of physical unable to be completed.    Labs: reviewed and pertinent noted above    Assessment/Plan:  Prudence Castaneda is a 32 y.o. female with ulcerative colitis with disease predominantly in the distal rectum. 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  Ulcerative colitis:  She continues to do well.  I recommend that we update labs with her next infusion.  We will also plan to go to once every other day for a month with the Canasa suppositories and if she does well we will try to stop these.  We also discussed that she is due for colonoscopy in the near future.  She is changing insurances at the beginning of next year so we will plan to wait until she has her new insurance establish before we schedule the  colonoscopy.      # IBD specific health maintenance:  Colon cancer surveillance:  Up-to-date    Annual:  - Eye exam:  Review in the future  - Skin exam (if on IMM/TNF):  Not applicable  - reminded pt to use sunblock/hats/sunprotective clothing  - PAP (if immunosuppressed):  Up-to-date    DEXA:  Not applicable    Vitamin D:  Previously on supplementation-recheck when possible    Vaccines:    Vaccines are up-to-date-recommend COVID booster .  Received flu shot at last infusion.    Follow up: Follow up in about 6 months (around 5/14/2023).    Thank you again for sending Prudence Castaneda to see Dr. Sung Chau today at the [facility]. Please don't hesitate to contact Dr. Chau if there are any questions regarding this evaluation, or if you have any other patients with inflammatory bowel disease for whom you would like a consultation. You can reach Dr. Chau at [phone] or by email at [email or web address]    Jason Chau MD  Department of Gastroenterology  Inflammatory Bowel Disease    \

## 2022-11-14 NOTE — PATIENT INSTRUCTIONS
Continue Entyvio  Decrease Canasa to every other day for a month then stop it if doing well  Labs with next infusion  Colonoscopy in 2023 (reach out when you have your new insurance)  Follow up in 6 months  Get COVID booster

## 2022-12-08 ENCOUNTER — OFFICE VISIT (OUTPATIENT)
Dept: OBSTETRICS AND GYNECOLOGY | Facility: CLINIC | Age: 32
End: 2022-12-08
Attending: STUDENT IN AN ORGANIZED HEALTH CARE EDUCATION/TRAINING PROGRAM
Payer: COMMERCIAL

## 2022-12-08 VITALS
SYSTOLIC BLOOD PRESSURE: 126 MMHG | DIASTOLIC BLOOD PRESSURE: 80 MMHG | BODY MASS INDEX: 24.92 KG/M2 | WEIGHT: 149.56 LBS | HEIGHT: 65 IN

## 2022-12-08 DIAGNOSIS — Z30.41 SURVEILLANCE FOR BIRTH CONTROL, ORAL CONTRACEPTIVES: ICD-10-CM

## 2022-12-08 DIAGNOSIS — Z11.51 SCREENING FOR HPV (HUMAN PAPILLOMAVIRUS): ICD-10-CM

## 2022-12-08 DIAGNOSIS — Z01.419 WELL WOMAN EXAM: ICD-10-CM

## 2022-12-08 DIAGNOSIS — Z12.4 SCREENING FOR CERVICAL CANCER: ICD-10-CM

## 2022-12-08 DIAGNOSIS — Z11.3 SCREEN FOR STD (SEXUALLY TRANSMITTED DISEASE): Primary | ICD-10-CM

## 2022-12-08 LAB
B-HCG UR QL: NEGATIVE
CTP QC/QA: YES

## 2022-12-08 PROCEDURE — 3008F PR BODY MASS INDEX (BMI) DOCUMENTED: ICD-10-PCS | Mod: CPTII,S$GLB,, | Performed by: STUDENT IN AN ORGANIZED HEALTH CARE EDUCATION/TRAINING PROGRAM

## 2022-12-08 PROCEDURE — 3079F DIAST BP 80-89 MM HG: CPT | Mod: CPTII,S$GLB,, | Performed by: STUDENT IN AN ORGANIZED HEALTH CARE EDUCATION/TRAINING PROGRAM

## 2022-12-08 PROCEDURE — 99395 PREV VISIT EST AGE 18-39: CPT | Mod: S$GLB,,, | Performed by: STUDENT IN AN ORGANIZED HEALTH CARE EDUCATION/TRAINING PROGRAM

## 2022-12-08 PROCEDURE — 81025 POCT URINE PREGNANCY: ICD-10-PCS | Mod: S$GLB,,, | Performed by: STUDENT IN AN ORGANIZED HEALTH CARE EDUCATION/TRAINING PROGRAM

## 2022-12-08 PROCEDURE — 87591 N.GONORRHOEAE DNA AMP PROB: CPT | Performed by: STUDENT IN AN ORGANIZED HEALTH CARE EDUCATION/TRAINING PROGRAM

## 2022-12-08 PROCEDURE — 3074F SYST BP LT 130 MM HG: CPT | Mod: CPTII,S$GLB,, | Performed by: STUDENT IN AN ORGANIZED HEALTH CARE EDUCATION/TRAINING PROGRAM

## 2022-12-08 PROCEDURE — 88175 CYTOPATH C/V AUTO FLUID REDO: CPT | Performed by: STUDENT IN AN ORGANIZED HEALTH CARE EDUCATION/TRAINING PROGRAM

## 2022-12-08 PROCEDURE — 1159F MED LIST DOCD IN RCRD: CPT | Mod: CPTII,S$GLB,, | Performed by: STUDENT IN AN ORGANIZED HEALTH CARE EDUCATION/TRAINING PROGRAM

## 2022-12-08 PROCEDURE — 99999 PR PBB SHADOW E&M-EST. PATIENT-LVL III: ICD-10-PCS | Mod: PBBFAC,,, | Performed by: STUDENT IN AN ORGANIZED HEALTH CARE EDUCATION/TRAINING PROGRAM

## 2022-12-08 PROCEDURE — 3079F PR MOST RECENT DIASTOLIC BLOOD PRESSURE 80-89 MM HG: ICD-10-PCS | Mod: CPTII,S$GLB,, | Performed by: STUDENT IN AN ORGANIZED HEALTH CARE EDUCATION/TRAINING PROGRAM

## 2022-12-08 PROCEDURE — 99999 PR PBB SHADOW E&M-EST. PATIENT-LVL III: CPT | Mod: PBBFAC,,, | Performed by: STUDENT IN AN ORGANIZED HEALTH CARE EDUCATION/TRAINING PROGRAM

## 2022-12-08 PROCEDURE — 3008F BODY MASS INDEX DOCD: CPT | Mod: CPTII,S$GLB,, | Performed by: STUDENT IN AN ORGANIZED HEALTH CARE EDUCATION/TRAINING PROGRAM

## 2022-12-08 PROCEDURE — 87491 CHLMYD TRACH DNA AMP PROBE: CPT | Performed by: STUDENT IN AN ORGANIZED HEALTH CARE EDUCATION/TRAINING PROGRAM

## 2022-12-08 PROCEDURE — 3074F PR MOST RECENT SYSTOLIC BLOOD PRESSURE < 130 MM HG: ICD-10-PCS | Mod: CPTII,S$GLB,, | Performed by: STUDENT IN AN ORGANIZED HEALTH CARE EDUCATION/TRAINING PROGRAM

## 2022-12-08 PROCEDURE — 99395 PR PREVENTIVE VISIT,EST,18-39: ICD-10-PCS | Mod: S$GLB,,, | Performed by: STUDENT IN AN ORGANIZED HEALTH CARE EDUCATION/TRAINING PROGRAM

## 2022-12-08 PROCEDURE — 1159F PR MEDICATION LIST DOCUMENTED IN MEDICAL RECORD: ICD-10-PCS | Mod: CPTII,S$GLB,, | Performed by: STUDENT IN AN ORGANIZED HEALTH CARE EDUCATION/TRAINING PROGRAM

## 2022-12-08 PROCEDURE — 87624 HPV HI-RISK TYP POOLED RSLT: CPT | Performed by: STUDENT IN AN ORGANIZED HEALTH CARE EDUCATION/TRAINING PROGRAM

## 2022-12-08 PROCEDURE — 81025 URINE PREGNANCY TEST: CPT | Mod: S$GLB,,, | Performed by: STUDENT IN AN ORGANIZED HEALTH CARE EDUCATION/TRAINING PROGRAM

## 2022-12-08 RX ORDER — FLUCONAZOLE 200 MG/1
TABLET ORAL
COMMUNITY
Start: 2022-12-07 | End: 2023-07-31

## 2022-12-08 RX ORDER — LEVONORGESTREL AND ETHINYL ESTRADIOL 150-30(84)
1 KIT ORAL DAILY
Qty: 90 EACH | Refills: 3 | Status: SHIPPED | OUTPATIENT
Start: 2022-12-08 | End: 2024-01-29 | Stop reason: SDUPTHER

## 2022-12-08 RX ORDER — SPIRONOLACTONE 25 MG/1
25 TABLET ORAL DAILY
COMMUNITY
Start: 2022-12-07

## 2022-12-08 RX ORDER — LEVONORGESTREL AND ETHINYL ESTRADIOL 150-30(84)
1 KIT ORAL
COMMUNITY
Start: 2022-11-19 | End: 2022-12-08 | Stop reason: SDUPTHER

## 2022-12-08 NOTE — PROGRESS NOTES
Chief Complaint: Well Woman Exam     HPI:      Prudence Castaneda is a 32 y.o.  who presents today for well woman exam.  LMP: Patient's last menstrual period was 2022 (approximate).  No issues, problems, or complaints. Specifically, patient denies abnormal vaginal bleeding, discharge, pelvic pain, urinary problems, or changes in appetite. Ms. Castaneda is currently sexually active with multiple partners - male and female.  She is currently using oral contraceptives (estrogen/progesterone) for contraception. She would like STD screening today.    Previous Pap: 2019 NEM, HPV neg    GYN Hx:  Menarche 12-13.  Reports cycles occur every 3 months while on OCPs with menses lasting 3-4 days.  History of abnormal pap with HPV, resolved.  Gardasil:  Received.   OB History          0    Para   0    Term   0       0    AB   0    Living   0         SAB   0    IAB   0    Ectopic   0    Multiple   0    Live Births   0               Past Medical History:   Diagnosis Date    Ulcerative colitis      Past Surgical History:   Procedure Laterality Date    APPENDECTOMY      COLONOSCOPY       Social History     Socioeconomic History    Marital status: Single   Tobacco Use    Smoking status: Never    Smokeless tobacco: Never   Substance and Sexual Activity    Alcohol use: Yes     Alcohol/week: 3.0 standard drinks     Types: 3 Glasses of wine per week     Comment: per week     Drug use: No    Sexual activity: Yes     Partners: Male     Family History   Problem Relation Age of Onset    Glaucoma Mother     No Known Problems Father     No Known Problems Brother     No Known Problems Brother        Current Outpatient Medications:     fluconazole (DIFLUCAN) 200 MG Tab, Take by mouth., Disp: , Rfl:     levonorgestrel-ethinyl estradiol (SEASONALE) 0.15 mg-30 mcg (91) per tablet, Take 1 tablet by mouth once daily., Disp: 91 tablet, Rfl: 0    mesalamine (CANASA) 1000 MG Supp, PLACE 1 SUPPOSITORY (1,000 MG TOTAL) RECTALLY  "NIGHTLY., Disp: 30 suppository, Rfl: 0    multivitamin (THERAGRAN) per tablet, Take 1 tablet by mouth once daily., Disp: , Rfl:     spironolactone (ALDACTONE) 25 MG tablet, Take 25 mg by mouth 2 (two) times daily., Disp: , Rfl:     vedolizumab (ENTYVIO IV), Inject into the vein every 28 days. , Disp: , Rfl:     DAYSEE 0.15 mg-30 mcg (84)/10 mcg (7) 3MPk, Take 1 tablet by mouth., Disp: , Rfl:     ROS:     GENERAL: Denies unintentional weight gain or weight loss. Feeling well overall.   SKIN: Denies rash or lesions.   HEENT: Denies headaches, or vision changes.   CARDIOVASCULAR: Denies palpitations or chest pain.   RESPIRATORY: Denies shortness of breath or dyspnea on exertion.  BREASTS: Denies pain, lumps, or nipple discharge.   ABDOMEN: Denies abdominal pain, constipation, diarrhea, nausea, vomiting, change in appetite.  URINARY: Denies frequency, dysuria, hematuria.  NEUROLOGIC: Denies syncope or weakness.   PSYCHIATRIC: Denies depression, anxiety or mood swings.    Physical Exam:      PHYSICAL EXAM:  /80   Ht 5' 5" (1.651 m)   Wt 67.8 kg (149 lb 9.3 oz)   LMP 09/08/2022 (Approximate)   BMI 24.89 kg/m²   Body mass index is 24.89 kg/m².     APPEARANCE: Well nourished, well developed, in no acute distress.  PSYCH: Appropriate mood and affect.  SKIN: No acne or hirsutism.  NECK: Neck symmetric without masses or thyromegaly.  NODES: No inguinal, axillary, or supraclavicular lymph node enlargement.  BREASTS: Symmetrical, no skin changes or visible lesions.  No palpable masses or nipple discharge bilaterally.  ABDOMEN: Soft.  No tenderness or masses.    PELVIC: Normal external genitalia without lesions.  Normal hair distribution.  Adequate perineal body, normal urethral meatus.  Vagina moist and well rugated without lesions or discharge.  Cervix pink, without lesions, discharge or tenderness.  No significant cystocele or rectocele.  Bimanual exam shows uterus to be normal size, regular, mobile and nontender.  " Adnexa without masses or tenderness.    EXTREMITIES: No edema.  No tenderness to palpation.  Upt neg  Assessment/Plan:     32 y.o.     Screen for STD (sexually transmitted disease)  -     C. trachomatis/N. gonorrhoeae by AMP DNA [facility]; Cervicovaginal    Screening for cervical cancer  -     Liquid-Based Pap Smear, Screening    Screening for HPV (human papillomavirus)  -     HPV High Risk Genotypes, PCR    Surveillance for birth control, oral contraceptives  -     POCT Urine Pregnancy    Well woman exam        Counselin.  Annual exam performed today without difficulty.  Patient was counseled today on current ASCCP pap guidelines, the recommendation for yearly pelvic exams, healthy diet and exercise routines, breast self awareness.  Pap smear collected.  All questions answered.    2.  Contraception:  Desires to continue OCPs.  R/B/A reviewed including but not limited to risk of cramping, irregular bleeding, nausea, bloating, breast tenderness, headache, stroke, blood clot, heart attack.  Patient voiced understanding and desires to proceed with treatment.  3.  STD testing:  GC/CT.  4.  Follow up with PCP for other health maintenance.  5.  Follow up in about 1 year (around 2023).      Use of the [facility] Patient Portal discussed and encouraged during today's visit.

## 2022-12-09 ENCOUNTER — TELEPHONE (OUTPATIENT)
Dept: GASTROENTEROLOGY | Facility: CLINIC | Age: 32
End: 2022-12-09
Payer: COMMERCIAL

## 2022-12-09 LAB
C TRACH DNA SPEC QL NAA+PROBE: NOT DETECTED
N GONORRHOEA DNA SPEC QL NAA+PROBE: NOT DETECTED

## 2022-12-16 LAB
FINAL PATHOLOGIC DIAGNOSIS: NORMAL
Lab: NORMAL

## 2022-12-27 LAB
HPV HR 12 DNA SPEC QL NAA+PROBE: POSITIVE
HPV16 AG SPEC QL: NEGATIVE
HPV18 DNA SPEC QL NAA+PROBE: NEGATIVE

## 2022-12-28 ENCOUNTER — PATIENT MESSAGE (OUTPATIENT)
Dept: OBSTETRICS AND GYNECOLOGY | Facility: CLINIC | Age: 32
End: 2022-12-28
Payer: COMMERCIAL

## 2022-12-28 ENCOUNTER — PATIENT MESSAGE (OUTPATIENT)
Dept: GASTROENTEROLOGY | Facility: CLINIC | Age: 32
End: 2022-12-28
Payer: COMMERCIAL

## 2022-12-28 DIAGNOSIS — K51.00 ULCERATIVE PANCOLITIS WITHOUT COMPLICATION: ICD-10-CM

## 2022-12-28 RX ORDER — MESALAMINE 1000 MG/1
1000 SUPPOSITORY RECTAL NIGHTLY
Qty: 90 SUPPOSITORY | Refills: 1 | OUTPATIENT
Start: 2022-12-28

## 2022-12-28 NOTE — TELEPHONE ENCOUNTER
Attempted to call no answer reached back out to portal   Will refuse refill at this time until updated as refilled 14 days ago with 60 day supply per changes in dosage.

## 2023-01-04 ENCOUNTER — OFFICE VISIT (OUTPATIENT)
Dept: URGENT CARE | Facility: CLINIC | Age: 33
End: 2023-01-04
Payer: COMMERCIAL

## 2023-01-04 VITALS
HEART RATE: 79 BPM | SYSTOLIC BLOOD PRESSURE: 131 MMHG | BODY MASS INDEX: 24.66 KG/M2 | OXYGEN SATURATION: 98 % | DIASTOLIC BLOOD PRESSURE: 78 MMHG | WEIGHT: 148 LBS | HEIGHT: 65 IN | RESPIRATION RATE: 20 BRPM | TEMPERATURE: 99 F

## 2023-01-04 DIAGNOSIS — J02.9 PHARYNGITIS, UNSPECIFIED ETIOLOGY: Primary | ICD-10-CM

## 2023-01-04 DIAGNOSIS — R13.10 PAINFUL SWALLOWING: ICD-10-CM

## 2023-01-04 DIAGNOSIS — R09.82 POST-NASAL DRIP: ICD-10-CM

## 2023-01-04 DIAGNOSIS — R59.9 GLANDS SWOLLEN: ICD-10-CM

## 2023-01-04 DIAGNOSIS — R09.81 NASAL CONGESTION: ICD-10-CM

## 2023-01-04 LAB
CTP QC/QA: YES
MOLECULAR STREP A: NEGATIVE

## 2023-01-04 PROCEDURE — 3008F PR BODY MASS INDEX (BMI) DOCUMENTED: ICD-10-PCS | Mod: CPTII,S$GLB,, | Performed by: NURSE PRACTITIONER

## 2023-01-04 PROCEDURE — 1160F PR REVIEW ALL MEDS BY PRESCRIBER/CLIN PHARMACIST DOCUMENTED: ICD-10-PCS | Mod: CPTII,S$GLB,, | Performed by: NURSE PRACTITIONER

## 2023-01-04 PROCEDURE — 3075F PR MOST RECENT SYSTOLIC BLOOD PRESS GE 130-139MM HG: ICD-10-PCS | Mod: CPTII,S$GLB,, | Performed by: NURSE PRACTITIONER

## 2023-01-04 PROCEDURE — 1159F MED LIST DOCD IN RCRD: CPT | Mod: CPTII,S$GLB,, | Performed by: NURSE PRACTITIONER

## 2023-01-04 PROCEDURE — 1159F PR MEDICATION LIST DOCUMENTED IN MEDICAL RECORD: ICD-10-PCS | Mod: CPTII,S$GLB,, | Performed by: NURSE PRACTITIONER

## 2023-01-04 PROCEDURE — 3075F SYST BP GE 130 - 139MM HG: CPT | Mod: CPTII,S$GLB,, | Performed by: NURSE PRACTITIONER

## 2023-01-04 PROCEDURE — 3078F PR MOST RECENT DIASTOLIC BLOOD PRESSURE < 80 MM HG: ICD-10-PCS | Mod: CPTII,S$GLB,, | Performed by: NURSE PRACTITIONER

## 2023-01-04 PROCEDURE — 3008F BODY MASS INDEX DOCD: CPT | Mod: CPTII,S$GLB,, | Performed by: NURSE PRACTITIONER

## 2023-01-04 PROCEDURE — 1160F RVW MEDS BY RX/DR IN RCRD: CPT | Mod: CPTII,S$GLB,, | Performed by: NURSE PRACTITIONER

## 2023-01-04 PROCEDURE — 3078F DIAST BP <80 MM HG: CPT | Mod: CPTII,S$GLB,, | Performed by: NURSE PRACTITIONER

## 2023-01-04 PROCEDURE — 99213 PR OFFICE/OUTPT VISIT, EST, LEVL III, 20-29 MIN: ICD-10-PCS | Mod: S$GLB,,, | Performed by: NURSE PRACTITIONER

## 2023-01-04 PROCEDURE — 87651 POCT STREP A MOLECULAR: ICD-10-PCS | Mod: QW,S$GLB,, | Performed by: NURSE PRACTITIONER

## 2023-01-04 PROCEDURE — 99213 OFFICE O/P EST LOW 20 MIN: CPT | Mod: S$GLB,,, | Performed by: NURSE PRACTITIONER

## 2023-01-04 PROCEDURE — 87651 STREP A DNA AMP PROBE: CPT | Mod: QW,S$GLB,, | Performed by: NURSE PRACTITIONER

## 2023-01-04 RX ORDER — FLUTICASONE PROPIONATE 50 MCG
2 SPRAY, SUSPENSION (ML) NASAL DAILY PRN
Qty: 15.8 ML | Refills: 0 | Status: SHIPPED | OUTPATIENT
Start: 2023-01-04 | End: 2024-01-22

## 2023-01-04 RX ORDER — PENICILLIN V POTASSIUM 500 MG/1
500 TABLET, FILM COATED ORAL 2 TIMES DAILY
Qty: 20 TABLET | Refills: 0 | Status: SHIPPED | OUTPATIENT
Start: 2023-01-04 | End: 2023-01-14

## 2023-01-04 NOTE — PROGRESS NOTES
"Subjective:       Patient ID: Prudence Castaneda is a 32 y.o. female.    Vitals:  height is 5' 5" (1.651 m) and weight is 67.1 kg (148 lb). Her temperature is 98.6 °F (37 °C). Her blood pressure is 131/78 and her pulse is 79. Her respiration is 20 and oxygen saturation is 98%.     Chief Complaint: Sore Throat (Entered by patient)    Pt presents with complaint of sore throat x1 day.  Pt states her sore throat started last night at work and progressed.  Pt states she has not taken any medications for her symptoms.  Pt states her friend was exposed to strep and pt reports hx of strep throat    32 year old female presents to clinic with a 1 day history of painful swallowing rated at a 7 on a scale 0-10, swollen and tender neck glands,.    Sore Throat   This is a new problem. The current episode started yesterday. The problem has been gradually worsening. Neither side of throat is experiencing more pain than the other. There has been no fever. The pain is at a severity of 5/10. The pain is moderate. Associated symptoms include congestion and trouble swallowing. Pertinent negatives include no abdominal pain. She has had no exposure to strep or mono. She has tried nothing for the symptoms. The treatment provided no relief.     Constitution: Positive for appetite change and fever. Negative for chills, sweating and fatigue.   HENT:  Positive for congestion, postnasal drip, sore throat and trouble swallowing.    Neck: Positive for painful lymph nodes.   Gastrointestinal:  Negative for abdominal pain.   Skin:  Negative for erythema.   Hematologic/Lymphatic: Positive for swollen lymph nodes.     Objective:      Physical Exam   Constitutional: She is oriented to person, place, and time. She appears well-developed.   HENT:   Head: Normocephalic and atraumatic. Head is without abrasion, without contusion and without laceration.   Ears:   Right Ear: External ear normal.   Left Ear: External ear normal.   Nose: Mucosal edema present. "   Mouth/Throat: Mucous membranes are normal. Posterior oropharyngeal erythema present.   Eyes: Conjunctivae, EOM and lids are normal. Pupils are equal, round, and reactive to light.   Neck: Trachea normal and phonation normal. Neck supple.   Cardiovascular: Normal rate, regular rhythm and normal heart sounds.   Pulmonary/Chest: Effort normal and breath sounds normal. No stridor. No respiratory distress.   Musculoskeletal: Normal range of motion.         General: Normal range of motion.   Lymphadenopathy:        Head (right side): Submandibular adenopathy present.        Head (left side): Submandibular adenopathy present.   Neurological: She is alert and oriented to person, place, and time.   Skin: Skin is warm, dry, intact and no rash. Capillary refill takes less than 2 seconds. No abrasion, No burn, No bruising, No erythema and No ecchymosis   Psychiatric: Her speech is normal and behavior is normal. Judgment and thought content normal.   Nursing note and vitals reviewed.      Assessment:       1. Pharyngitis, unspecified etiology    2. Nasal congestion    3. Post-nasal drip    4. Glands swollen    5. Painful swallowing        Results for orders placed or performed in visit on 01/04/23   POCT Strep A, Molecular   Result Value Ref Range    Molecular Strep A, POC Negative Negative     Acceptable Yes       Plan:         Pharyngitis, unspecified etiology  -     POCT Strep A, Molecular  -     penicillin v potassium (VEETID) 500 MG tablet; Take 1 tablet (500 mg total) by mouth 2 (two) times daily. for 10 days  Dispense: 20 tablet; Refill: 0    Nasal congestion  -     fluticasone propionate (FLONASE) 50 mcg/actuation nasal spray; 2 sprays (100 mcg total) by Each Nostril route daily as needed for Rhinitis.  Dispense: 15.8 mL; Refill: 0    Post-nasal drip    Glands swollen  -     penicillin v potassium (VEETID) 500 MG tablet; Take 1 tablet (500 mg total) by mouth 2 (two) times daily. for 10 days  Dispense: 20  tablet; Refill: 0    Painful swallowing  -     diphenhydrAMINE-aluminum-magnesium hydroxide-simethicone-LIDOcaine HCl 2%; Swish and spit 15 mLs every 4 (four) hours as needed (throat pain).  Dispense: 120 each; Refill: 0  -     penicillin v potassium (VEETID) 500 MG tablet; Take 1 tablet (500 mg total) by mouth 2 (two) times daily. for 10 days  Dispense: 20 tablet; Refill: 0         Patient Instructions   Please drink plenty of fluids.  Please get plenty of rest.  If you were given wait & see antibiotics, please wait 3-5 days before taking them, and only take them if your symptoms have worsened or not improved.  If you do begin taking the antibiotics, please take them to completion.  If you were prescribed antibiotics, please take them to completion.  If you do not have Hypertension or any history of palpitations, it is ok to take over the counter Sudafed or Mucinex D or Allegra-D or Claritin-D or Zyrtec-D.  If you do take one of the above, it is ok to combine that with plain over the counter Mucinex or Allegra or Claritin or Zyrtec.  If for example you are taking Zyrtec -D, you can combine that with Mucinex, but not Mucinex-D.  If you are taking Mucinex-D, you can combine that with plain Allegra or Claritin or Zyrtec.   If you do have Hypertension or palpitations, it is safe to take Coricidin HBP for relief of sinus symptoms.  We recommend you take Flonase (Fluticasone) or another nasally inhaled steroid unless you are already taking one.  Nasal irrigation with a saline spray or Netti Pot like device per their directions is also recommended.  If not allergic, please take over the counter Tylenol (Acetaminophen) and/or Motrin (Ibuprofen) as directed for control of pain and/or fever.  Please follow up with your primary care doctor or specialist as needed.  Please return here or go to the Emergency Department for any concerns or worsening of condition.  If you  smoke, please stop smoking.

## 2023-01-04 NOTE — PATIENT INSTRUCTIONS
Please drink plenty of fluids.  Please get plenty of rest.  If you were given wait & see antibiotics, please wait 3-5 days before taking them, and only take them if your symptoms have worsened or not improved.  If you do begin taking the antibiotics, please take them to completion.  If you were prescribed antibiotics, please take them to completion.  If you do not have Hypertension or any history of palpitations, it is ok to take over the counter Sudafed or Mucinex D or Allegra-D or Claritin-D or Zyrtec-D.  If you do take one of the above, it is ok to combine that with plain over the counter Mucinex or Allegra or Claritin or Zyrtec.  If for example you are taking Zyrtec -D, you can combine that with Mucinex, but not Mucinex-D.  If you are taking Mucinex-D, you can combine that with plain Allegra or Claritin or Zyrtec.   If you do have Hypertension or palpitations, it is safe to take Coricidin HBP for relief of sinus symptoms.  We recommend you take Flonase (Fluticasone) or another nasally inhaled steroid unless you are already taking one.  Nasal irrigation with a saline spray or Netti Pot like device per their directions is also recommended.  If not allergic, please take over the counter Tylenol (Acetaminophen) and/or Motrin (Ibuprofen) as directed for control of pain and/or fever.  Please follow up with your primary care doctor or specialist as needed.  Please return here or go to the Emergency Department for any concerns or worsening of condition.  If you  smoke, please stop smoking.

## 2023-01-05 ENCOUNTER — TELEPHONE (OUTPATIENT)
Dept: OBSTETRICS AND GYNECOLOGY | Facility: CLINIC | Age: 33
End: 2023-01-05
Payer: COMMERCIAL

## 2023-01-05 NOTE — TELEPHONE ENCOUNTER
Spoke with patient.  Reviewed ASCCP guidelines and all questions answered.  NEM, HPV non 16/18 pos.  Patient in agreement.  All questions answered.

## 2023-01-20 ENCOUNTER — PATIENT MESSAGE (OUTPATIENT)
Dept: GASTROENTEROLOGY | Facility: CLINIC | Age: 33
End: 2023-01-20
Payer: COMMERCIAL

## 2023-01-20 ENCOUNTER — TELEPHONE (OUTPATIENT)
Dept: GASTROENTEROLOGY | Facility: CLINIC | Age: 33
End: 2023-01-20
Payer: COMMERCIAL

## 2023-01-26 RX ORDER — METHYLPREDNISOLONE SOD SUCC 125 MG
40 VIAL (EA) INJECTION
Status: CANCELLED | OUTPATIENT
Start: 2023-01-26

## 2023-01-26 RX ORDER — IPRATROPIUM BROMIDE AND ALBUTEROL SULFATE 2.5; .5 MG/3ML; MG/3ML
3 SOLUTION RESPIRATORY (INHALATION)
Status: CANCELLED | OUTPATIENT
Start: 2023-01-26

## 2023-01-26 RX ORDER — DIPHENHYDRAMINE HYDROCHLORIDE 50 MG/ML
25 INJECTION INTRAMUSCULAR; INTRAVENOUS
Status: CANCELLED | OUTPATIENT
Start: 2023-01-26

## 2023-01-26 RX ORDER — SODIUM CHLORIDE 0.9 % (FLUSH) 0.9 %
10 SYRINGE (ML) INJECTION
Status: CANCELLED | OUTPATIENT
Start: 2023-01-26

## 2023-01-26 RX ORDER — EPINEPHRINE 1 MG/ML
0.3 INJECTION, SOLUTION, CONCENTRATE INTRAVENOUS
Status: CANCELLED | OUTPATIENT
Start: 2023-01-26

## 2023-01-26 RX ORDER — ACETAMINOPHEN 325 MG/1
650 TABLET ORAL
Status: CANCELLED | OUTPATIENT
Start: 2023-01-26

## 2023-01-26 RX ORDER — HEPARIN 100 UNIT/ML
500 SYRINGE INTRAVENOUS
Status: CANCELLED | OUTPATIENT
Start: 2023-01-26

## 2023-01-26 NOTE — PROGRESS NOTES
- Current therapy: Entyvio 300mg IV every 4 weeks (started fall 2019, increased to Q4W in 12/2020, LD 1/5/23, ND 2/2/23) + Canasa       - Last office visit: 11/14/2022  - Labs: CBC/CMP 12/2022, repeat 6/2023; TB and Hep B neg 12/2022, repeat 12/2023.  - Next office visit: 5/15/2022  - Allergies reviewed.   - Therapy plan updated.

## 2023-02-15 ENCOUNTER — TELEPHONE (OUTPATIENT)
Dept: GASTROENTEROLOGY | Facility: CLINIC | Age: 33
End: 2023-02-15
Payer: COMMERCIAL

## 2023-04-03 ENCOUNTER — TELEPHONE (OUTPATIENT)
Dept: GASTROENTEROLOGY | Facility: CLINIC | Age: 33
End: 2023-04-03
Payer: COMMERCIAL

## 2023-06-21 ENCOUNTER — PATIENT MESSAGE (OUTPATIENT)
Dept: GASTROENTEROLOGY | Facility: CLINIC | Age: 33
End: 2023-06-21
Payer: COMMERCIAL

## 2023-07-31 ENCOUNTER — OFFICE VISIT (OUTPATIENT)
Dept: GASTROENTEROLOGY | Facility: CLINIC | Age: 33
End: 2023-07-31
Payer: COMMERCIAL

## 2023-07-31 VITALS
DIASTOLIC BLOOD PRESSURE: 85 MMHG | WEIGHT: 146.81 LBS | TEMPERATURE: 98 F | SYSTOLIC BLOOD PRESSURE: 130 MMHG | OXYGEN SATURATION: 100 % | BODY MASS INDEX: 24.43 KG/M2 | HEART RATE: 57 BPM

## 2023-07-31 DIAGNOSIS — K51.00 ULCERATIVE PANCOLITIS WITHOUT COMPLICATION: Primary | ICD-10-CM

## 2023-07-31 PROCEDURE — 1160F RVW MEDS BY RX/DR IN RCRD: CPT | Mod: CPTII,S$GLB,, | Performed by: INTERNAL MEDICINE

## 2023-07-31 PROCEDURE — 1159F MED LIST DOCD IN RCRD: CPT | Mod: CPTII,S$GLB,, | Performed by: INTERNAL MEDICINE

## 2023-07-31 PROCEDURE — 99214 OFFICE O/P EST MOD 30 MIN: CPT | Mod: S$GLB,,, | Performed by: INTERNAL MEDICINE

## 2023-07-31 PROCEDURE — 3008F BODY MASS INDEX DOCD: CPT | Mod: CPTII,S$GLB,, | Performed by: INTERNAL MEDICINE

## 2023-07-31 PROCEDURE — 1159F PR MEDICATION LIST DOCUMENTED IN MEDICAL RECORD: ICD-10-PCS | Mod: CPTII,S$GLB,, | Performed by: INTERNAL MEDICINE

## 2023-07-31 PROCEDURE — 3079F DIAST BP 80-89 MM HG: CPT | Mod: CPTII,S$GLB,, | Performed by: INTERNAL MEDICINE

## 2023-07-31 PROCEDURE — 1160F PR REVIEW ALL MEDS BY PRESCRIBER/CLIN PHARMACIST DOCUMENTED: ICD-10-PCS | Mod: CPTII,S$GLB,, | Performed by: INTERNAL MEDICINE

## 2023-07-31 PROCEDURE — 3008F PR BODY MASS INDEX (BMI) DOCUMENTED: ICD-10-PCS | Mod: CPTII,S$GLB,, | Performed by: INTERNAL MEDICINE

## 2023-07-31 PROCEDURE — 3075F PR MOST RECENT SYSTOLIC BLOOD PRESS GE 130-139MM HG: ICD-10-PCS | Mod: CPTII,S$GLB,, | Performed by: INTERNAL MEDICINE

## 2023-07-31 PROCEDURE — 99214 PR OFFICE/OUTPT VISIT, EST, LEVL IV, 30-39 MIN: ICD-10-PCS | Mod: S$GLB,,, | Performed by: INTERNAL MEDICINE

## 2023-07-31 PROCEDURE — 3075F SYST BP GE 130 - 139MM HG: CPT | Mod: CPTII,S$GLB,, | Performed by: INTERNAL MEDICINE

## 2023-07-31 PROCEDURE — 3079F PR MOST RECENT DIASTOLIC BLOOD PRESSURE 80-89 MM HG: ICD-10-PCS | Mod: CPTII,S$GLB,, | Performed by: INTERNAL MEDICINE

## 2023-07-31 NOTE — PROGRESS NOTES
[facility]          Inflammatory Bowel Disease Follow Up Note         TODAY'S VISIT DATE:  7/31/2023    Reason for Consult:    Chief Complaint   Patient presents with    Ulcerative Colitis       PCP: Primary Doctor No      Referring MD:   No ref. provider found    History of Present Illness:  Prudence Castaneda who is a 32 y.o. female is being seen today at the [facility] on 07/31/2023 for inflammatory bowel disease- ulcerative colitis.   She continues to do well.  She continues taking Entyvio every 4 weeks.  She was able to stop the Canasa suppositories without any issues.  She reports that she occasionally has some loose bowel movement and sometimes has some bloating and cramping issues but has not noticed any significant blood in the stools or any symptoms associated with her ulcerative colitis.  She denies any new complaints today.  She denies any side effects associated with the Entyvio.    IBD History:  She was diagnosed with ulcerative colitis in 2017.  At that time she was having bloody diarrhea as well as mucus in the stools.  She underwent a colonoscopy that revealed endoscopically active disease only involving the distal rectum although biopsies showed chronic inflammatory changes throughout the colon.  She was placed on Canasa suppositories which helped to some degree.  She was also treated with sulfasalazine.  I met her about a year ago because of ongoing symptoms.  At that time she was taking Apriso and Canasa.  She was having about 6-7 loose bowel movements a day in spite of taking medications.  Because of insurance issues there was some delay in getting her scoped to reassess her disease activity.  In September of last year she underwent a colonoscopy as part of the pre enrollment evaluation for clinical trial for mirikizumab and she was found to have active inflammation (Cooper 2) in the distal 10 cm of the rectum but no endoscopically  "visible disease proximal to this.  Biopsies of the rectum showed chronic active inflammation but biopsies from the remainder of the colon did not show any evidence of inflammation.  She was taking Apriso at that time but was not taking Canasa.  She did not qualify for enrollment in the clinical trial and after discussion of treatment options she elected to start Entyvio.  She started this around October or November of last year.  She has continued to take Apriso and Canasa along with this.  Entyvio was increased to every 4 weeks in December of 2020 because of a low level in ongoing symptoms.  Apriso was stopped.  Since increasing the dose of Entyvio she has had a significant improvement.    IBD Details:  Dx Date:  2017  Disease type/distribution:  Ulcerative colitis/endoscopically active disease in the distal rectum but biopsies with pan colonic chronic inflammatory changes  Current Treatment:  Entyvio every 4 weeks Start Date:  Fall 2019/dose increase in December 2020 Response:  Good  Optimized:  Yes Adverse reactions:  None  Prior surgeries:  None  CRP Elevation:  Yes   calprotectin -elevated during active disease  Disease Complications:  None  Extraintestinal manifestations:  None  Prior treatments:   Steroids:  None  5ASA:  Incomplete response  IMM:  None  TNF Inh:  None   Anti-Integrin:  Currently taking   IL 12/23:  None  NICOLE Inh:  None    Previous Clinical Trials:  Attempted enrollment-did not qualify because of limited disease distribution.    Last Colonoscopy:  September 2019-proctitis involving the last 10 cm of the rectum.  Biopsy showed no active inflammation in the remainder of her colon.    Other Endoscopies:  None    Imaging:   MRE:  None   CT:  None   Other:  None    Pertinent Labs:  Lab Results   Component Value Date    SEDRATE 0 05/01/2018    CRP 2.3 12/14/2022     No results found for: "TTGIGA", "IGA"  No results found for: "TSH", "FREET4"  Lab Results   Component Value Date    OMYSLPXA65TF 28 (L) " "12/14/2022     Lab Results   Component Value Date    HEPBSAG Non-reactive 12/14/2022    HEPBCAB Non-reactive 12/14/2022     No results found for: "OCZ37MCXE"  Lab Results   Component Value Date    NIL 0.90044 12/14/2022    MITOGENNIL 9.984 12/14/2022     No results found for: "TPTMINTERP", "TPMTRESULT"  No results found for: "STOOLCULTURE", "FXEPLVAWQA7B", "JIDIODNCUB1E", "CDIFFICILEAN", "CDIFFTOX", "CDIFFICILEBY"  Lab Results   Component Value Date    CALPROTECTIN <27.1 09/21/2020       Therapeutic Drug Monitoring Labs:  No results found for: "PROMETH"  No results found for: "ANSADAINIT", "INFLIXIMAB", "INFLIXINTERP"    Vaccinations:  No results found for: "HEPBSAB"  No results found for: "HEPAIGG"  No results found for: "VARICELLAZOS", "VARICELLAINT"  Immunization History   Administered Date(s) Administered    COVID-19, MRNA, LN-S, PF (MODERNA FULL 0.5 ML DOSE) 03/29/2021, 04/26/2021    Influenza - Quadrivalent - PF *Preferred* (6 months and older) 02/06/2020, 10/13/2022    Influenza - Trivalent (ADULT) 01/09/2015    Influenza Split 01/09/2015    Tdap 02/13/2020         Review of Systems  Review of Systems   Constitutional:  Negative for chills, fever and weight loss.   HENT:  Negative for sore throat.    Eyes:  Negative for pain, discharge and redness.   Respiratory:  Negative for cough, shortness of breath and wheezing.    Cardiovascular:  Negative for chest pain, orthopnea and leg swelling.   Gastrointestinal:  Negative for abdominal pain, blood in stool, constipation, diarrhea, heartburn, melena, nausea and vomiting.   Genitourinary:  Negative for dysuria, frequency and urgency.   Musculoskeletal:  Negative for back pain, joint pain and myalgias.   Skin:  Negative for itching and rash.   Neurological:  Negative for focal weakness and seizures.   Endo/Heme/Allergies:  Does not bruise/bleed easily.   Psychiatric/Behavioral:  Negative for depression. The patient is not nervous/anxious.        Medical History: "   Past Medical History:   Diagnosis Date    Ulcerative colitis        Surgical History:  Past Surgical History:   Procedure Laterality Date    APPENDECTOMY      COLONOSCOPY         Family History:   Family History   Problem Relation Age of Onset    Glaucoma Mother     No Known Problems Father     No Known Problems Brother     No Known Problems Brother        Social History:   Social History     Tobacco Use    Smoking status: Never    Smokeless tobacco: Never   Substance Use Topics    Alcohol use: Yes     Alcohol/week: 3.0 standard drinks of alcohol     Types: 3 Glasses of wine per week     Comment: per week     Drug use: No       Allergies: Reviewed    Home Medications:   Medication List with Changes/Refills   Current Medications    DAYSEE 0.15 MG-30 MCG (84)/10 MCG (7) 3MPK    Take 1 tablet by mouth once daily.    DIPHENHYDRAMINE-ALUMINUM-MAGNESIUM HYDROXIDE-SIMETHICONE-LIDOCAINE HCL 2%    Swish and spit 15 mLs every 4 (four) hours as needed (throat pain).    FLUTICASONE PROPIONATE (FLONASE) 50 MCG/ACTUATION NASAL SPRAY    2 sprays (100 mcg total) by Each Nostril route daily as needed for Rhinitis.    MULTIVITAMIN (THERAGRAN) PER TABLET    Take 1 tablet by mouth once daily.    SPIRONOLACTONE (ALDACTONE) 25 MG TABLET    Take 25 mg by mouth 2 (two) times daily.    VEDOLIZUMAB (ENTYVIO IV)    Inject into the vein every 28 days.    Discontinued Medications    FLUCONAZOLE (DIFLUCAN) 200 MG TAB    Take by mouth.    LEVONORGESTREL-ETHINYL ESTRADIOL (SEASONALE) 0.15 MG-30 MCG (91) PER TABLET    Take 1 tablet by mouth once daily.    MESALAMINE (CANASA) 1000 MG SUPP    PLACE 1 SUPPOSITORY (1,000 MG TOTAL) RECTALLY NIGHTLY.       Physical Exam:  Vital Signs:  /85 (BP Location: Right arm, Patient Position: Sitting)   Pulse (!) 57   Temp 98 °F (36.7 °C)   Wt 66.6 kg (146 lb 13.2 oz)   LMP 07/18/2023   SpO2 100%   BMI 24.43 kg/m²   Body mass index is 24.43 kg/m².    Physical Exam  Vitals and nursing note reviewed.    Constitutional:       General: She is not in acute distress.     Appearance: Normal appearance. She is well-developed. She is not ill-appearing or toxic-appearing.   Eyes:      Conjunctiva/sclera: Conjunctivae normal.      Pupils: Pupils are equal, round, and reactive to light.   Neck:      Thyroid: No thyromegaly.   Cardiovascular:      Rate and Rhythm: Normal rate and regular rhythm.      Heart sounds: Normal heart sounds. No murmur heard.  Pulmonary:      Effort: Pulmonary effort is normal.      Breath sounds: Normal breath sounds. No wheezing or rales.   Abdominal:      General: Bowel sounds are normal. There is no distension.      Palpations: Abdomen is soft. There is no mass.      Tenderness: There is no abdominal tenderness.   Musculoskeletal:         General: No tenderness. Normal range of motion.      Right lower leg: No edema.      Left lower leg: No edema.   Lymphadenopathy:      Cervical: No cervical adenopathy.   Skin:     Findings: No erythema or rash.   Neurological:      General: No focal deficit present.      Mental Status: She is alert and oriented to person, place, and time.   Psychiatric:         Mood and Affect: Mood normal.         Behavior: Behavior normal.         Thought Content: Thought content normal.         Judgment: Judgment normal.       Telemedicine visit. Remainder of physical unable to be completed.    Labs: reviewed and pertinent noted above    Assessment/Plan:  Prudence Castaneda is a 32 y.o. female with ulcerative colitis with disease predominantly in the distal rectum. 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  Ulcerative colitis:  Overall she continues to do well.  We will plan to update labs.  Continue Entyvio every 4 weeks.  We will arrange for a colonoscopy in the near future to reassess her disease activity.      2. Bloating: We discussed dietary factors that may play a role.  We discussed a low FODMAP diet.   I would avoid lactose containing foods, carbonated beverages, artificial sweeteners.  Discussed air swallowing.  Can consider align probiotics or ib guard.      # IBD specific health maintenance:  Colon cancer surveillance:  Up-to-date    Annual:  - Eye exam:  Review in the future  - Skin exam (if on IMM/TNF):  Not applicable  - reminded pt to use sunblock/hats/sunprotective clothing  - PAP (if immunosuppressed):  Up-to-date    DEXA:  Not applicable    Vitamin D:  Previously on supplementation-recheck when possible    Vaccines:    Vaccines are up-to-date-recommend COVID booster .  Received flu shot at last infusion.    Follow up: Follow up in about 6 months (around 1/31/2024).    Thank you again for sending Prudence Castaneda to see Dr. Sung Chau today at the [facility]. Please don't hesitate to contact Dr. Chau if there are any questions regarding this evaluation, or if you have any other patients with inflammatory bowel disease for whom you would like a consultation. You can reach Dr. Chau at [phone] or by email at [email or web address]    Jason Chau MD  Department of Gastroenterology  Inflammatory Bowel Disease

## 2023-07-31 NOTE — PROGRESS NOTES
IBD PATIENT INTAKE:    COVID symptoms in the last 7 days (runny nose, sore throat, congestion, cough, fever): No  PCP: Primary Doctor No  If not PCP-  number given to establish [phone]: No    ALLERGIES REVIEWED:  Yes    CHIEF COMPLAINT:    Chief Complaint   Patient presents with    Ulcerative Colitis       VITAL SIGNS:  /85 (BP Location: Right arm, Patient Position: Sitting)   Pulse (!) 57   Temp 98 °F (36.7 °C)   Wt 66.6 kg (146 lb 13.2 oz)   LMP 07/18/2023   SpO2 100%   BMI 24.43 kg/m²      Change in medical, surgical, family or social history: No    IBD THERAPY (name, dose/frequency):  Entyvio q4w   Last dose:  7/28    Next dose:  8/24  Infusion/Pharmacy: [facility]    Vitamins (be sure this has been put in medication list):   Vit D:  no     Vit B-12:  no   Folic Acid: no       Calcium: no     Iron:  no      MVI: yes    Antibiotics (past 30 Days):  No  If yes   Indication:  Name of antibiotic:  Completion date:     REVIEWED MEDICATION LIST RECONCILED INCLUDING ABOVE MEDS:  Yes

## 2023-07-31 NOTE — PATIENT INSTRUCTIONS
Get updated labs  Continue Entyvio  Review low FODMAPs diet  Think about Align probiotics and IBGard  Colonoscopy  Follow up in 6 months

## 2023-08-02 ENCOUNTER — TELEPHONE (OUTPATIENT)
Dept: ENDOSCOPY | Facility: HOSPITAL | Age: 33
End: 2023-08-02
Payer: COMMERCIAL

## 2023-08-02 NOTE — TELEPHONE ENCOUNTER
"Contacted patient regarding Colonoscopy procedure(s) requested by referring provider, Dr. Chau. Patient did not answer. Left message for the patient to return call to my direct number at [phone].         ----- Message from Dunia De La Rosa sent at 2023  8:50 AM CDT -----    ----- Message -----  From: Jason Chau MD  Sent: 2023   9:10 AM CDT  To: [facility]    Procedure: Colonoscopy    Diagnosis: Ulcerative colitis    Procedure Timin-12 weeks    #If within 4 weeks selected, please selene as high priority#    #If greater than 12 weeks, please select "4-12 weeks" and delay sending until 2 months prior to requested date#     Provider: Myself    Location: [address]    Additional Scheduling Information: No scheduling concerns    Prep Specifications:Standard prep    Have you attached a patient to this message: Yes       "

## 2023-08-03 ENCOUNTER — TELEPHONE (OUTPATIENT)
Dept: ENDOSCOPY | Facility: HOSPITAL | Age: 33
End: 2023-08-03
Payer: COMMERCIAL

## 2023-08-03 NOTE — TELEPHONE ENCOUNTER
"2nd attempt to contact patient regarding Colonoscopy procedure(s) requested by referring provider, Dr. Chau. Patient did not answer. Left message for the patient to return call to my direct number at [phone].       ----- Message from Dunia De La Rosa sent at 2023  8:50 AM CDT -----    ----- Message -----  From: Jason Chau MD  Sent: 2023   9:10 AM CDT  To: [facility]    Procedure: Colonoscopy    Diagnosis: Ulcerative colitis    Procedure Timin-12 weeks    #If within 4 weeks selected, please selene as high priority#    #If greater than 12 weeks, please select "4-12 weeks" and delay sending until 2 months prior to requested date#     Provider: Myself    Location: [address]    Additional Scheduling Information: No scheduling concerns    Prep Specifications:Standard prep    Have you attached a patient to this message: Yes       "

## 2023-08-07 ENCOUNTER — TELEPHONE (OUTPATIENT)
Dept: ENDOSCOPY | Facility: HOSPITAL | Age: 33
End: 2023-08-07
Payer: COMMERCIAL

## 2023-08-07 NOTE — TELEPHONE ENCOUNTER
3rd attempt to contact patient regarding Colonoscopy procedure(s) requested by referring provider, Dr. Chau. Patient did not answer. Left message for the patient to return call to my direct number at [phone]. Unable to reach you letter mailed to address on file.

## 2023-08-07 NOTE — TELEPHONE ENCOUNTER
"----- Message from Dunia De La Rosa sent at 2023  8:50 AM CDT -----    ----- Message -----  From: Jason Chau MD  Sent: 2023   9:10 AM CDT  To: [facility] Patients    Procedure: Colonoscopy    Diagnosis: Ulcerative colitis    Procedure Timin-12 weeks    #If within 4 weeks selected, please selene as high priority#    #If greater than 12 weeks, please select "4-12 weeks" and delay sending until 2 months prior to requested date#     Provider: Myself    Location: [address]    Additional Scheduling Information: No scheduling concerns    Prep Specifications:Standard prep    Have you attached a patient to this message: Yes       "

## 2023-10-05 ENCOUNTER — PATIENT MESSAGE (OUTPATIENT)
Dept: GASTROENTEROLOGY | Facility: CLINIC | Age: 33
End: 2023-10-05
Payer: COMMERCIAL

## 2023-10-11 ENCOUNTER — OFFICE VISIT (OUTPATIENT)
Dept: URGENT CARE | Facility: CLINIC | Age: 33
End: 2023-10-11
Payer: COMMERCIAL

## 2023-10-11 VITALS
BODY MASS INDEX: 24.66 KG/M2 | HEIGHT: 65 IN | RESPIRATION RATE: 20 BRPM | DIASTOLIC BLOOD PRESSURE: 87 MMHG | OXYGEN SATURATION: 98 % | TEMPERATURE: 98 F | WEIGHT: 148 LBS | SYSTOLIC BLOOD PRESSURE: 132 MMHG | HEART RATE: 74 BPM

## 2023-10-11 DIAGNOSIS — K51.00 ULCERATIVE PANCOLITIS WITHOUT COMPLICATION: ICD-10-CM

## 2023-10-11 DIAGNOSIS — R05.9 COUGH, UNSPECIFIED TYPE: ICD-10-CM

## 2023-10-11 DIAGNOSIS — Z91.89 AT RISK FOR INFECTION DUE TO IMMUNOSUPPRESSION: ICD-10-CM

## 2023-10-11 DIAGNOSIS — J01.20 SUBACUTE ETHMOIDAL SINUSITIS: Primary | ICD-10-CM

## 2023-10-11 LAB
CTP QC/QA: YES
SARS-COV-2 AG RESP QL IA.RAPID: NEGATIVE

## 2023-10-11 PROCEDURE — 99213 OFFICE O/P EST LOW 20 MIN: CPT | Mod: S$GLB,,, | Performed by: FAMILY MEDICINE

## 2023-10-11 PROCEDURE — 87811 SARS CORONAVIRUS 2 ANTIGEN POCT, MANUAL READ: ICD-10-PCS | Mod: QW,S$GLB,, | Performed by: FAMILY MEDICINE

## 2023-10-11 PROCEDURE — 87811 SARS-COV-2 COVID19 W/OPTIC: CPT | Mod: QW,S$GLB,, | Performed by: FAMILY MEDICINE

## 2023-10-11 PROCEDURE — 99213 PR OFFICE/OUTPT VISIT, EST, LEVL III, 20-29 MIN: ICD-10-PCS | Mod: S$GLB,,, | Performed by: FAMILY MEDICINE

## 2023-10-11 RX ORDER — PREDNISONE 20 MG/1
20 TABLET ORAL 2 TIMES DAILY
Qty: 10 TABLET | Refills: 0 | Status: SHIPPED | OUTPATIENT
Start: 2023-10-11 | End: 2023-10-16

## 2023-10-11 RX ORDER — AZITHROMYCIN 250 MG/1
TABLET, FILM COATED ORAL
Qty: 6 TABLET | Refills: 0 | Status: SHIPPED | OUTPATIENT
Start: 2023-10-11 | End: 2024-01-22

## 2023-10-11 RX ORDER — PREDNISONE 20 MG/1
20 TABLET ORAL 2 TIMES DAILY
Qty: 10 TABLET | Refills: 0 | Status: SHIPPED | OUTPATIENT
Start: 2023-10-11 | End: 2023-10-11 | Stop reason: SDUPTHER

## 2023-10-11 NOTE — PATIENT INSTRUCTIONS
Recommend you hold off on beginning the antibiotic and try just the oral prednisone for a few days and then if not improving go ahead and begin antibiotic. Return for any worsening chest pain SOB or failure to improve

## 2023-10-11 NOTE — PROGRESS NOTES
"Subjective:      Patient ID: Prudence Castaneda is a 33 y.o. female.    Vitals:  height is 5' 5" (1.651 m) and weight is 67.1 kg (148 lb). Her temperature is 98.4 °F (36.9 °C). Her blood pressure is 132/87 and her pulse is 74. Her respiration is 20 and oxygen saturation is 98%.     Chief Complaint: Nasal Congestion (Aches, lethargy - Entered by patient)    Pt presents with complaint of sinus pressure, headache, fatigue, ear pressure x1 week.  Pt states there have been a few people in her office that have been sick.  Pt states sh has been taking tylenol and Flonase for her symptoms.  Pt reports a history of bacterial sinusitis(somewhat chronic). She is also on some immune suppressant for her UC    Sinus Problem  This is a new problem. The current episode started in the past 7 days. The problem has been waxing and waning since onset. There has been no fever. Her pain is at a severity of 2/10. The pain is mild. Associated symptoms include congestion, coughing, headaches and sinus pressure. Treatments tried: flonase, tylenol. The treatment provided mild relief.       HENT:  Positive for congestion and sinus pressure.    Respiratory:  Positive for cough.    Neurological:  Positive for headaches.      Objective:     Physical Exam   Constitutional: She is oriented to person, place, and time. She appears well-developed. She is cooperative.  Non-toxic appearance. She does not appear ill. No distress.   HENT:   Head: Normocephalic and atraumatic.   Ears:   Right Ear: Hearing, external ear and ear canal normal.   Left Ear: Hearing, external ear and ear canal normal.      Comments: Tms dull and retracted  Nose: Congestion present. No mucosal edema, rhinorrhea or nasal deformity. No epistaxis. Right sinus exhibits no maxillary sinus tenderness and no frontal sinus tenderness. Left sinus exhibits no maxillary sinus tenderness and no frontal sinus tenderness.      Comments: Turbinate edema , congestion, rhinorrhea, ethmoidal sinus " ttp  Mouth/Throat: Uvula is midline, oropharynx is clear and moist and mucous membranes are normal. Mucous membranes are moist. No trismus in the jaw. Normal dentition. No uvula swelling. No oropharyngeal exudate, posterior oropharyngeal edema or posterior oropharyngeal erythema.   Eyes: Conjunctivae and lids are normal. Pupils are equal, round, and reactive to light. No scleral icterus. Extraocular movement intact      Comments: Conjunctiva mildly injected   Neck: Trachea normal and phonation normal. Neck supple. No edema present. No erythema present. No neck rigidity present.   Cardiovascular: Normal rate, regular rhythm, normal heart sounds and normal pulses.   Pulmonary/Chest: Effort normal and breath sounds normal. No respiratory distress. She has no decreased breath sounds. She has no rhonchi.   Abdominal: Normal appearance.   Musculoskeletal: Normal range of motion.         General: No deformity or edema. Normal range of motion.   Lymphadenopathy:     She has no cervical adenopathy.   Neurological: She is alert and oriented to person, place, and time. She exhibits normal muscle tone. Coordination normal.   Skin: Skin is warm, dry, intact, not diaphoretic and not pale.   Psychiatric: Her speech is normal and behavior is normal. Judgment and thought content normal.   Nursing note and vitals reviewed.      Assessment:     1. Subacute ethmoidal sinusitis    2. Cough, unspecified type    3. 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 At risk for infection due to immunosuppression        Plan:       Subacute ethmoidal sinusitis  -     predniSONE (DELTASONE) 20 MG tablet; Take 1 tablet (20 mg total) by mouth 2 (two) times daily. for 5 days  Dispense: 10 tablet; Refill: 0    If not improving in 4-5 days then :  -     azithromycin (Z-SARAN) 250 MG tablet; 2 on first day then one tablet a day for 4 days  Dispense: 6 tablet; Refill: 0    Cough, unspecified type  -     SARS Coronavirus 2 Antigen, POCT  Manual Read    Pt or guardian provided educational materials and instructions regarding their visit diagnosis.

## 2023-10-12 ENCOUNTER — TELEPHONE (OUTPATIENT)
Dept: ENDOSCOPY | Facility: HOSPITAL | Age: 33
End: 2023-10-12
Payer: COMMERCIAL

## 2023-10-12 NOTE — TELEPHONE ENCOUNTER
Contacted the patient to schedule an endoscopy procedure(s) . The patient did not answer the call and left a voice message requesting a call back.

## 2023-10-12 NOTE — TELEPHONE ENCOUNTER
----- Message from Dunia De La Rosa sent at 10/12/2023  2:40 PM CDT -----  Regarding: FW: [facility] colonoscopy  Contact: @ [phone]    ----- Message -----  From: Javon Lyn  Sent: 10/12/2023  10:52 AM CDT  To: [facility] Endo Schedulers  Subject: [facility] colonoscopy                                  Pt is calling to speak to someone in the office to schedule procedure. Pt is asking for a return call soon.         Type of Procedure: Colonoscopy         Additional Information: Pt is wanting it with Dr. Chau

## 2023-10-16 ENCOUNTER — TELEPHONE (OUTPATIENT)
Dept: ENDOSCOPY | Facility: HOSPITAL | Age: 33
End: 2023-10-16
Payer: COMMERCIAL

## 2023-10-18 ENCOUNTER — TELEPHONE (OUTPATIENT)
Dept: ENDOSCOPY | Facility: HOSPITAL | Age: 33
End: 2023-10-18
Payer: COMMERCIAL

## 2023-10-23 ENCOUNTER — TELEPHONE (OUTPATIENT)
Dept: ENDOSCOPY | Facility: HOSPITAL | Age: 33
End: 2023-10-23
Payer: COMMERCIAL

## 2023-11-07 ENCOUNTER — TELEPHONE (OUTPATIENT)
Dept: GASTROENTEROLOGY | Facility: CLINIC | Age: 33
End: 2023-11-07
Payer: COMMERCIAL

## 2023-11-07 NOTE — TELEPHONE ENCOUNTER
Attempted to call patient to reschedule appointment as Dr. Chau will be out of the office  LM to return call [phone] , will send portal

## 2023-12-26 RX ORDER — KETOROLAC TROMETHAMINE 10 MG/1
10 TABLET, FILM COATED ORAL EVERY 6 HOURS
Qty: 12 TABLET | Refills: 0 | Status: SHIPPED | OUTPATIENT
Start: 2023-12-26 | End: 2024-01-22

## 2023-12-26 RX ORDER — PREDNISONE 20 MG/1
40 TABLET ORAL DAILY
Qty: 6 TABLET | Refills: 0 | Status: SHIPPED | OUTPATIENT
Start: 2023-12-26 | End: 2024-01-22

## 2024-01-18 DIAGNOSIS — K51.00 ULCERATIVE PANCOLITIS WITHOUT COMPLICATION: Primary | ICD-10-CM

## 2024-01-18 NOTE — PROGRESS NOTES
- Current therapy:  Entyvio every 4 weeks Start Date:  Fall 2019/dose increase in December 2020   - Last office visit: 7/31/2023  - Labs: CBC/CMP 7/2023, repeat 1/2024; TB , Hep B neg 12/2022, repeat now 1/2024.  - Next office visit: 1/22/2024  - Allergies reviewed.   - Therapy plan updated.

## 2024-01-22 ENCOUNTER — CLINICAL SUPPORT (OUTPATIENT)
Dept: GASTROENTEROLOGY | Facility: CLINIC | Age: 34
End: 2024-01-22
Payer: COMMERCIAL

## 2024-01-22 VITALS
BODY MASS INDEX: 24.53 KG/M2 | OXYGEN SATURATION: 95 % | DIASTOLIC BLOOD PRESSURE: 84 MMHG | HEIGHT: 65 IN | SYSTOLIC BLOOD PRESSURE: 123 MMHG | TEMPERATURE: 98 F | HEART RATE: 68 BPM | WEIGHT: 147.25 LBS

## 2024-01-22 DIAGNOSIS — K51.00 ULCERATIVE PANCOLITIS WITHOUT COMPLICATION: Primary | ICD-10-CM

## 2024-01-22 NOTE — PROGRESS NOTES
"[facility]  Inflammatory Bowel Disease  Pharmacy Note    TODAY'S VISIT DATE:  1/22/2024    Reason for visit: Follow up       IBD MD: Kandi       History of Present Illness: Prudence Castaneda is a 33 year old female with history of Ulcerative colitis/endoscopically active disease in the distal rectum but biopsies with pan colonic chronic inflammatory changes, who presents to clinic for a follow up with the IBD team. Since her last office visit in 7/2023, she reports doing well and her GI symptoms remain stable while on entyvio 300mg IV every 4 weeks. She attempted to get a colonoscopy scheduled in October, but there were difficulties getting in touch with the scheduling team. She has not had a colonoscopy since September 2019, which at the time showed no active inflammation outside of 10cm of the rectum. Currently reports having 2 soft to formed BM a day without blood, mucus, or urgency. Denies abdominal pain and nocturnal BMs. She is in the middle of changing insurances at the end of this month. Has a new job and health insurance not provided through new employer. Patient plans to get BCBS through marketplace and is concerned that entyvio will no longer be covered. Next dose of entyvio currently scheduled earlier for 1/31, prior to current insurance becoming inactive.      Dispensing pharmacy/infusion center:  [facility]  Current therapy: entyvio 300mg IV every 4 weeks ( started fall 2019, optimized 12/2020, LD 1/11, ND 1/31)  Last Colonoscopy:  September 2019 - proctitis involving the last 10 cm of the rectum.  Biopsy showed no active inflammation in the remainder of her colon.       Therapeutic Drug Monitoring Labs:  9/21/2020 - VDZ trough level 13/ neg Ab    Prior IBD Therapies:  Apriso - Incomplete response     Vaccinations:  No results found for: "HEPBSAB"  No results found for: "HEPBSURFABQU"  No results found for: "HEPAIGG"  No results found for: "VARICELLAZOS", "VARICELLAINT"  No results found " "for: "MUMPSIGGSCRE", "MUMPSIGGINTE"  No results found for: "RUBEOLAIGGAN", "RUBEOLAINTER"  Immunization History   Administered Date(s) Administered    COVID-19, MRNA, LN-S, PF (MODERNA FULL 0.5 ML DOSE) 03/29/2021, 04/26/2021    Influenza - Quadrivalent - PF *Preferred* (6 months and older) 02/06/2020, 10/13/2022, 09/21/2023    Influenza - Trivalent (ADULT) 01/09/2015    Influenza Split 01/09/2015    Tdap 02/13/2020       Influenza: recommended yearly. UTD    PCV 20: recommended today. Pt will get with ND of entyvio   Tetanus (TdaP): booster recommended every 10 years. Repeat 2030  HPV: got as teenager. UTD     Hepatitis B:  check immunity   Hepatitis A:  check immunity  MMR (live vaccine):  check immunity      Chickenpox status/Varicella (live vaccine): check immunity  Shingrix: discussed and recommended. Will line up with future infusions after new insurance situation resolved.  Covid:  discussed and recommended pt gets at local pharmacy       All Medical History/Surgical History/Family History/Social History/Allergies have been reviewed and updated in EMR    Review of patient's allergies indicates:  No Known Allergies      Current Medications:   Outpatient Medications Marked as Taking for the 1/22/24 encounter (Clinical Support) with IBD PHARMACIST   Medication Sig Dispense Refill    DAYSEE 0.15 mg-30 mcg (84)/10 mcg (7) 3MPk Take 1 tablet by mouth once daily. 90 each 3    multivitamin (THERAGRAN) per tablet Take 1 tablet by mouth once daily.      spironolactone (ALDACTONE) 25 MG tablet Take 25 mg by mouth once daily.      vedolizumab (ENTYVIO IV) Inject into the vein every 28 days.          Reviewed all current medications including OTC, herbals and supplements.     Labs:   Lab Results   Component Value Date    HEPBSAG Non-reactive 12/14/2022    HEPBCAB Non-reactive 12/14/2022     Lab Results   Component Value Date    TBGOLDPLUS Negative 12/14/2022     Lab Results   Component Value Date    ZMPBEHUD73PL 28 (L) " 12/14/2022     Lab Results   Component Value Date    WBC 7.92 07/31/2023    HGB 13.9 07/31/2023    HCT 40.4 07/31/2023    MCV 90 07/31/2023     07/31/2023     Lab Results   Component Value Date    CREATININE 0.9 07/31/2023    ALBUMIN 4.0 07/31/2023    BILITOT 0.6 07/31/2023    ALKPHOS 40 (L) 07/31/2023    AST 16 07/31/2023    ALT 19 07/31/2023         Assessment/Plan:  Prudence Castaneda is a 33 y.o. female that  has a past medical history of Ulcerative colitis. Patient presents to clinic for a follow up visit with IBD pharmacist. She continues to be stable on entyvio 300mg IV every 4 weeks. Patient has no main concerns today outside of her insurance change coming up. Discussed need for a colonoscopy to restage her disease, but due to new insurance starting next month will defer colonoscopy for a few months. Reviewed different types of patient assistance available depending on type of insurance. Patient to keep us informed on insurance updates.       # Ulcerative colitis/endoscopically active disease in the distal rectum but biopsies with pan colonic chronic inflammatory changes:  - continue entyvio 300mg IV every 4 weeks, keep ND as is.   - emphasized importance of updating us with newest insurance info so we can avoid gaps in entyvio therapy moving forward  - labs and PCV20 with next infusion   - CBC/CMP every 6 months, repeat 1/2024; TB and hep B yearly, repeat 1/2024  - colonoscopy deferred after insurance issues resolved  - f/u with Dr. Chau in 6 months      # Immunosuppressed status and IBD specific health maintenance   Colon cancer surveillance:  Will discuss once new insurance is active.  Annual:  - Eye exam:  Not applicable at this time.  - Skin exam:  Last skin exam over a year ago - normal. Advised routine follow up, call derm and schedule next skin exam.   - reminded pt to use sunblock/hats/sunprotective clothing  - PAP (if immunosuppressed):  normal PAP in 12/2022. Repeat yearly. Recommended she  calls and gets appt scheduled.    DEXA:  Not applicable  Vitamin D:  Previously on supplementation-recheck when possible  Vaccines: PCV20 with next infusion; covid at local pharmacy; at next OV or infusion will start shingrix series     Gisell Stringer, PharmD  IBD Clinical Pharmacist

## 2024-01-22 NOTE — PATIENT INSTRUCTIONS
- continue entyvio   - let us know when new insurance card is active  - labs and pneumonia with next infusion   - Dr. Chau f/u in 6 mo   - schedule appt with OB/GYN and dermatology

## 2024-01-29 RX ORDER — LEVONORGESTREL AND ETHINYL ESTRADIOL 150-30(84)
1 KIT ORAL DAILY
Qty: 90 EACH | Refills: 0 | Status: SHIPPED | OUTPATIENT
Start: 2024-01-29 | End: 2024-03-26 | Stop reason: SDUPTHER

## 2024-01-31 ENCOUNTER — PATIENT MESSAGE (OUTPATIENT)
Dept: GASTROENTEROLOGY | Facility: CLINIC | Age: 34
End: 2024-01-31
Payer: COMMERCIAL

## 2024-02-12 ENCOUNTER — TELEPHONE (OUTPATIENT)
Dept: GASTROENTEROLOGY | Facility: CLINIC | Age: 34
End: 2024-02-12
Payer: COMMERCIAL

## 2024-02-12 NOTE — TELEPHONE ENCOUNTER
Attempted to call the patient to see if she can get copies of new insurance to office  - sent portal message this morning , not read  LM to return call [phone]     ----- Message from Gisell Stringer PharmD sent at 2/12/2024 11:48 AM CST -----  Pt has new insurance, she sent us a portal message on 1/31 to inform us. Can you please follow up on getting the full insurance info from the patient and notifying the  so they can update on her chart. Currently [facility] shows but it is not correct. Once insurance is cleared she will need to get scheduled for a scope. Discussed at the last office visit with me last month.     Thanks!

## 2024-02-14 ENCOUNTER — TELEPHONE (OUTPATIENT)
Dept: GASTROENTEROLOGY | Facility: CLINIC | Age: 34
End: 2024-02-14
Payer: COMMERCIAL

## 2024-02-14 NOTE — TELEPHONE ENCOUNTER
Patient read portal message on 2/12/24 asking for updated insurance information and copies of front and back of insurance card  - no response to message    ----- Message from Gisell Stringer PharmD sent at 2/12/2024 11:48 AM CST -----  Pt has new insurance, she sent us a portal message on 1/31 to inform us. Can you please follow up on getting the full insurance info from the patient and notifying the  so they can update on her chart. Currently [facility] shows but it is not correct. Once insurance is cleared she will need to get scheduled for a scope. Discussed at the last office visit with me last month.     Thanks!

## 2024-03-26 ENCOUNTER — OFFICE VISIT (OUTPATIENT)
Dept: OBSTETRICS AND GYNECOLOGY | Facility: CLINIC | Age: 34
End: 2024-03-26
Attending: STUDENT IN AN ORGANIZED HEALTH CARE EDUCATION/TRAINING PROGRAM
Payer: COMMERCIAL

## 2024-03-26 ENCOUNTER — LAB VISIT (OUTPATIENT)
Dept: LAB | Facility: OTHER | Age: 34
End: 2024-03-26
Attending: STUDENT IN AN ORGANIZED HEALTH CARE EDUCATION/TRAINING PROGRAM
Payer: COMMERCIAL

## 2024-03-26 VITALS
DIASTOLIC BLOOD PRESSURE: 72 MMHG | WEIGHT: 146.5 LBS | HEIGHT: 65 IN | BODY MASS INDEX: 24.41 KG/M2 | SYSTOLIC BLOOD PRESSURE: 122 MMHG

## 2024-03-26 DIAGNOSIS — Z11.3 SCREENING FOR STD (SEXUALLY TRANSMITTED DISEASE): ICD-10-CM

## 2024-03-26 DIAGNOSIS — Z30.41 SURVEILLANCE FOR BIRTH CONTROL, ORAL CONTRACEPTIVES: ICD-10-CM

## 2024-03-26 DIAGNOSIS — Z01.419 WELL WOMAN EXAM: Primary | ICD-10-CM

## 2024-03-26 DIAGNOSIS — Z12.4 PAP SMEAR FOR CERVICAL CANCER SCREENING: ICD-10-CM

## 2024-03-26 LAB
B-HCG UR QL: NEGATIVE
CTP QC/QA: YES
HBV SURFACE AG SERPL QL IA: NORMAL
HCV AB SERPL QL IA: NORMAL
HIV 1+2 AB+HIV1 P24 AG SERPL QL IA: NORMAL
RPR SER QL: NORMAL

## 2024-03-26 PROCEDURE — 3008F BODY MASS INDEX DOCD: CPT | Mod: CPTII,S$GLB,, | Performed by: STUDENT IN AN ORGANIZED HEALTH CARE EDUCATION/TRAINING PROGRAM

## 2024-03-26 PROCEDURE — 87389 HIV-1 AG W/HIV-1&-2 AB AG IA: CPT | Performed by: STUDENT IN AN ORGANIZED HEALTH CARE EDUCATION/TRAINING PROGRAM

## 2024-03-26 PROCEDURE — 99395 PREV VISIT EST AGE 18-39: CPT | Mod: S$GLB,,, | Performed by: STUDENT IN AN ORGANIZED HEALTH CARE EDUCATION/TRAINING PROGRAM

## 2024-03-26 PROCEDURE — 88175 CYTOPATH C/V AUTO FLUID REDO: CPT | Performed by: STUDENT IN AN ORGANIZED HEALTH CARE EDUCATION/TRAINING PROGRAM

## 2024-03-26 PROCEDURE — 86592 SYPHILIS TEST NON-TREP QUAL: CPT | Performed by: STUDENT IN AN ORGANIZED HEALTH CARE EDUCATION/TRAINING PROGRAM

## 2024-03-26 PROCEDURE — 81025 URINE PREGNANCY TEST: CPT | Mod: S$GLB,,, | Performed by: STUDENT IN AN ORGANIZED HEALTH CARE EDUCATION/TRAINING PROGRAM

## 2024-03-26 PROCEDURE — 87491 CHLMYD TRACH DNA AMP PROBE: CPT | Performed by: STUDENT IN AN ORGANIZED HEALTH CARE EDUCATION/TRAINING PROGRAM

## 2024-03-26 PROCEDURE — 86696 HERPES SIMPLEX TYPE 2 TEST: CPT | Performed by: STUDENT IN AN ORGANIZED HEALTH CARE EDUCATION/TRAINING PROGRAM

## 2024-03-26 PROCEDURE — 3078F DIAST BP <80 MM HG: CPT | Mod: CPTII,S$GLB,, | Performed by: STUDENT IN AN ORGANIZED HEALTH CARE EDUCATION/TRAINING PROGRAM

## 2024-03-26 PROCEDURE — 1159F MED LIST DOCD IN RCRD: CPT | Mod: CPTII,S$GLB,, | Performed by: STUDENT IN AN ORGANIZED HEALTH CARE EDUCATION/TRAINING PROGRAM

## 2024-03-26 PROCEDURE — 86803 HEPATITIS C AB TEST: CPT | Performed by: STUDENT IN AN ORGANIZED HEALTH CARE EDUCATION/TRAINING PROGRAM

## 2024-03-26 PROCEDURE — 87624 HPV HI-RISK TYP POOLED RSLT: CPT | Performed by: STUDENT IN AN ORGANIZED HEALTH CARE EDUCATION/TRAINING PROGRAM

## 2024-03-26 PROCEDURE — 3074F SYST BP LT 130 MM HG: CPT | Mod: CPTII,S$GLB,, | Performed by: STUDENT IN AN ORGANIZED HEALTH CARE EDUCATION/TRAINING PROGRAM

## 2024-03-26 PROCEDURE — 36415 COLL VENOUS BLD VENIPUNCTURE: CPT | Performed by: STUDENT IN AN ORGANIZED HEALTH CARE EDUCATION/TRAINING PROGRAM

## 2024-03-26 PROCEDURE — 99459 PELVIC EXAMINATION: CPT | Mod: S$GLB,,, | Performed by: STUDENT IN AN ORGANIZED HEALTH CARE EDUCATION/TRAINING PROGRAM

## 2024-03-26 PROCEDURE — 99999 PR PBB SHADOW E&M-EST. PATIENT-LVL III: CPT | Mod: PBBFAC,,, | Performed by: STUDENT IN AN ORGANIZED HEALTH CARE EDUCATION/TRAINING PROGRAM

## 2024-03-26 PROCEDURE — 87340 HEPATITIS B SURFACE AG IA: CPT | Performed by: STUDENT IN AN ORGANIZED HEALTH CARE EDUCATION/TRAINING PROGRAM

## 2024-03-26 RX ORDER — LEVONORGESTREL AND ETHINYL ESTRADIOL 150-30(84)
1 KIT ORAL DAILY
Qty: 90 EACH | Refills: 3 | Status: SHIPPED | OUTPATIENT
Start: 2024-03-26

## 2024-03-26 RX ORDER — CLINDAMYCIN PHOSPHATE AND BENZOYL PEROXIDE 10; 50 MG/G; MG/G
GEL TOPICAL
COMMUNITY
Start: 2024-03-01

## 2024-03-26 NOTE — PROGRESS NOTES
Chief Complaint: Well Woman Exam     HPI:      Prudence Castaneda is a 33 y.o.  who presents today for well woman exam.  LMP: No LMP recorded. (Menstrual status: Birth Control).  No issues, problems, or complaints. Specifically, patient denies abnormal vaginal bleeding, discharge, pelvic pain, urinary problems, or changes in appetite. Ms. Castaneda is currently sexually active with multiple partners - male and female.  She is currently using oral contraceptives (estrogen/progesterone) for contraception. She would like STD screening today.    Previous Pap: 2019 NEM, HPV neg    GYN Hx:  Menarche 12-13.  Reports cycles occur every 3 months while on OCPs with menses lasting 3-4 days.  History of abnormal pap with HPV, resolved.  Gardasil:  Received.   OB History          0    Para   0    Term   0       0    AB   0    Living   0         SAB   0    IAB   0    Ectopic   0    Multiple   0    Live Births   0               Past Medical History:   Diagnosis Date    Ulcerative colitis      Past Surgical History:   Procedure Laterality Date    APPENDECTOMY      COLONOSCOPY       Social History     Socioeconomic History    Marital status: Single   Tobacco Use    Smoking status: Never    Smokeless tobacco: Never   Substance and Sexual Activity    Alcohol use: Yes     Alcohol/week: 3.0 standard drinks of alcohol     Types: 3 Glasses of wine per week     Comment: per week     Drug use: No    Sexual activity: Yes     Partners: Male     Family History   Problem Relation Age of Onset    Glaucoma Mother     No Known Problems Father     No Known Problems Brother     No Known Problems Brother        Current Outpatient Medications:     clindamycin-benzoyl peroxide gel, Apply topically., Disp: , Rfl:     multivitamin (THERAGRAN) per tablet, Take 1 tablet by mouth once daily., Disp: , Rfl:     spironolactone (ALDACTONE) 25 MG tablet, Take 25 mg by mouth once daily., Disp: , Rfl:     vedolizumab (ENTYVIO IV), Inject into the  "vein every 28 days. , Disp: , Rfl:     DAYSEE 0.15 mg-30 mcg (84)/10 mcg (7) 3MPk, Take 1 tablet by mouth once daily., Disp: 90 each, Rfl: 3    ROS:     GENERAL: Denies unintentional weight gain or weight loss. Feeling well overall.   SKIN: Denies rash or lesions.   HEENT: Denies headaches, or vision changes.   CARDIOVASCULAR: Denies palpitations or chest pain.   RESPIRATORY: Denies shortness of breath or dyspnea on exertion.  BREASTS: Denies pain, lumps, or nipple discharge.   ABDOMEN: Denies abdominal pain, constipation, diarrhea, nausea, vomiting, change in appetite.  URINARY: Denies frequency, dysuria, hematuria.  NEUROLOGIC: Denies syncope or weakness.   PSYCHIATRIC: Denies depression, anxiety or mood swings.    Physical Exam:      PHYSICAL EXAM:  /72   Ht 5' 5" (1.651 m)   Wt 66.4 kg (146 lb 7.9 oz)   BMI 24.38 kg/m²   Body mass index is 24.38 kg/m².     APPEARANCE: Well nourished, well developed, in no acute distress.  PSYCH: Appropriate mood and affect.  SKIN: No acne or hirsutism.  NECK: Neck symmetric without masses or thyromegaly.  NODES: No inguinal, axillary, or supraclavicular lymph node enlargement.  BREASTS: Symmetrical, no skin changes or visible lesions.  No palpable masses or nipple discharge bilaterally.  ABDOMEN: Soft.  No tenderness or masses.    PELVIC: Normal external genitalia without lesions.  Normal hair distribution.  Adequate perineal body, normal urethral meatus.  Vagina moist and well rugated without lesions or discharge.  Cervix pink, without lesions, discharge or tenderness.  No significant cystocele or rectocele.  Bimanual exam shows uterus to be normal size, regular, mobile and nontender.  Adnexa without masses or tenderness.    EXTREMITIES: No edema.  No tenderness to palpation.  Upt neg  Female chaperone was present for exam.     Assessment/Plan:     33 y.o.     Well woman exam    Pap smear for cervical cancer screening  -     Liquid-Based Pap Smear, Screening  -  "    HPV High Risk Genotypes, PCR    Screening for STD (sexually transmitted disease)  -     C. trachomatis/N. gonorrhoeae by AMP DNA [facility]; Cervicovaginal  -     HIV 1/2 Ag/Ab (4th Gen); Future; Expected date: 2024  -     RPR; Future; Expected date: 2024  -     HEPATITIS B SURFACE ANTIGEN; Future; Expected date: 2024  -     HEPATITIS C ANTIBODY; Future; Expected date: 2024  -     HERPES SIMPLEX 1&2 IGG; Future; Expected date: 2024    Surveillance for birth control, oral contraceptives  -     POCT Urine Pregnancy    Other orders  -     DAYSEE 0.15 mg-30 mcg (84)/10 mcg (7) 3MPk; Take 1 tablet by mouth once daily.  Dispense: 90 each; Refill: 3            Counselin.  Annual exam performed today without difficulty.  Patient was counseled today on current ASCCP pap guidelines, the recommendation for yearly pelvic exams, healthy diet and exercise routines, breast self awareness.  Pap smear collected.  All questions answered.    2.  Contraception:  Desires to continue OCPs.  R/B/A reviewed including but not limited to risk of cramping, irregular bleeding, nausea, bloating, breast tenderness, headache, stroke, blood clot, heart attack.  Patient voiced understanding and desires to proceed with treatment.  3.  STD testing:  GC/CT and blood work, also wants hsv screen - reviewed utility and all questions answered.  4.  Follow up with PCP for other health maintenance.  5.  Follow up in about 1 year (around 3/26/2025).      Use of the [employer] Patient Portal discussed and encouraged during today's visit.

## 2024-03-27 LAB
C TRACH DNA SPEC QL NAA+PROBE: NOT DETECTED
HPV HR 12 DNA SPEC QL NAA+PROBE: POSITIVE
HPV16 AG SPEC QL: NEGATIVE
HPV18 DNA SPEC QL NAA+PROBE: NEGATIVE
HSV1 IGG SERPL QL IA: POSITIVE
HSV2 IGG SERPL QL IA: NEGATIVE
N GONORRHOEA DNA SPEC QL NAA+PROBE: NOT DETECTED

## 2024-03-28 ENCOUNTER — TELEPHONE (OUTPATIENT)
Dept: OBSTETRICS AND GYNECOLOGY | Facility: CLINIC | Age: 34
End: 2024-03-28
Payer: COMMERCIAL

## 2024-04-02 ENCOUNTER — TELEPHONE (OUTPATIENT)
Dept: OBSTETRICS AND GYNECOLOGY | Facility: CLINIC | Age: 34
End: 2024-04-02

## 2024-04-02 LAB
FINAL PATHOLOGIC DIAGNOSIS: NORMAL
Lab: NORMAL

## 2024-04-02 NOTE — TELEPHONE ENCOUNTER
Spoke with patient.  Reviewed ASCCP guidelines and all questions answered.  NEM, hpv non 16/18 pos x 2 years.  Recommend colposcopy.  Patient in agreement.  All questions answered.      Please schedule for colposcopy within 4 weeks.  Thank you!

## 2024-05-24 ENCOUNTER — PROCEDURE VISIT (OUTPATIENT)
Dept: OBSTETRICS AND GYNECOLOGY | Facility: CLINIC | Age: 34
End: 2024-05-24
Attending: STUDENT IN AN ORGANIZED HEALTH CARE EDUCATION/TRAINING PROGRAM
Payer: COMMERCIAL

## 2024-05-24 VITALS
HEIGHT: 65 IN | DIASTOLIC BLOOD PRESSURE: 86 MMHG | BODY MASS INDEX: 24.28 KG/M2 | SYSTOLIC BLOOD PRESSURE: 138 MMHG | WEIGHT: 145.75 LBS

## 2024-05-24 DIAGNOSIS — Z01.812 PRE-PROCEDURE LAB EXAM: ICD-10-CM

## 2024-05-24 DIAGNOSIS — R87.810 CERVICAL HIGH RISK HUMAN PAPILLOMAVIRUS (HPV) DNA TEST POSITIVE: Primary | ICD-10-CM

## 2024-05-24 LAB
B-HCG UR QL: NEGATIVE
CTP QC/QA: YES

## 2024-05-24 PROCEDURE — 57454 BX/CURETT OF CERVIX W/SCOPE: CPT | Mod: S$GLB,,, | Performed by: STUDENT IN AN ORGANIZED HEALTH CARE EDUCATION/TRAINING PROGRAM

## 2024-05-24 PROCEDURE — 88305 TISSUE EXAM BY PATHOLOGIST: CPT | Mod: 26,,, | Performed by: PATHOLOGY

## 2024-05-24 PROCEDURE — 88305 TISSUE EXAM BY PATHOLOGIST: CPT | Mod: 59 | Performed by: PATHOLOGY

## 2024-05-24 PROCEDURE — 81025 URINE PREGNANCY TEST: CPT | Mod: S$GLB,,, | Performed by: STUDENT IN AN ORGANIZED HEALTH CARE EDUCATION/TRAINING PROGRAM

## 2024-05-24 RX ORDER — ERGOCALCIFEROL (VITAMIN D2) 200 MCG/ML
DROPS ORAL
COMMUNITY

## 2024-05-28 ENCOUNTER — TELEPHONE (OUTPATIENT)
Dept: OBSTETRICS AND GYNECOLOGY | Facility: CLINIC | Age: 34
End: 2024-05-28
Payer: COMMERCIAL

## 2024-05-28 LAB
FINAL PATHOLOGIC DIAGNOSIS: NORMAL
GROSS: NORMAL
Lab: NORMAL

## 2024-07-22 ENCOUNTER — PATIENT MESSAGE (OUTPATIENT)
Dept: GASTROENTEROLOGY | Facility: CLINIC | Age: 34
End: 2024-07-22
Payer: COMMERCIAL

## 2024-07-24 ENCOUNTER — OFFICE VISIT (OUTPATIENT)
Dept: OBSTETRICS AND GYNECOLOGY | Facility: CLINIC | Age: 34
End: 2024-07-24
Payer: COMMERCIAL

## 2024-07-24 VITALS
DIASTOLIC BLOOD PRESSURE: 66 MMHG | HEIGHT: 65 IN | WEIGHT: 145.63 LBS | SYSTOLIC BLOOD PRESSURE: 116 MMHG | BODY MASS INDEX: 24.26 KG/M2

## 2024-07-24 DIAGNOSIS — Z11.3 SCREEN FOR STD (SEXUALLY TRANSMITTED DISEASE): ICD-10-CM

## 2024-07-24 DIAGNOSIS — N89.8 VAGINAL ITCHING: Primary | ICD-10-CM

## 2024-07-24 DIAGNOSIS — Z72.89 OTHER PROBLEMS RELATED TO LIFESTYLE: ICD-10-CM

## 2024-07-24 DIAGNOSIS — N76.0 ACUTE VAGINITIS: ICD-10-CM

## 2024-07-24 PROCEDURE — 1160F RVW MEDS BY RX/DR IN RCRD: CPT | Mod: CPTII,S$GLB,,

## 2024-07-24 PROCEDURE — 3074F SYST BP LT 130 MM HG: CPT | Mod: CPTII,S$GLB,,

## 2024-07-24 PROCEDURE — 3078F DIAST BP <80 MM HG: CPT | Mod: CPTII,S$GLB,,

## 2024-07-24 PROCEDURE — 81514 NFCT DS BV&VAGINITIS DNA ALG: CPT

## 2024-07-24 PROCEDURE — 87591 N.GONORRHOEAE DNA AMP PROB: CPT

## 2024-07-24 PROCEDURE — 99999 PR PBB SHADOW E&M-EST. PATIENT-LVL III: CPT | Mod: PBBFAC,,,

## 2024-07-24 PROCEDURE — 99213 OFFICE O/P EST LOW 20 MIN: CPT | Mod: S$GLB,,,

## 2024-07-24 PROCEDURE — 3008F BODY MASS INDEX DOCD: CPT | Mod: CPTII,S$GLB,,

## 2024-07-24 PROCEDURE — 87491 CHLMYD TRACH DNA AMP PROBE: CPT

## 2024-07-24 PROCEDURE — 1159F MED LIST DOCD IN RCRD: CPT | Mod: CPTII,S$GLB,,

## 2024-07-24 RX ORDER — NYSTATIN AND TRIAMCINOLONE ACETONIDE 100000; 1 [USP'U]/G; MG/G
CREAM TOPICAL
Qty: 30 G | Refills: 1 | Status: SHIPPED | OUTPATIENT
Start: 2024-07-24 | End: 2025-07-24

## 2024-07-24 RX ORDER — FLUCONAZOLE 150 MG/1
150 TABLET ORAL
Qty: 2 TABLET | Refills: 0 | Status: SHIPPED | OUTPATIENT
Start: 2024-07-24

## 2024-07-24 NOTE — PROGRESS NOTES
"CC: vaginal itching    HPI:  Prudence Castaneda is a 33 y.o. female  presents with complaint of vaginal itching. Reports itching for the past few weeks. Did notice a possible lesion to vaginal area. Does report + HSV1 blood test. Never had outbreak. Denies vaginal odor and discharge. She is SA with multiple partners, male and female. Uses condoms.     Past Medical History:   Diagnosis Date    Ulcerative colitis      Past Surgical History:   Procedure Laterality Date    APPENDECTOMY      COLONOSCOPY       Social History     Tobacco Use    Smoking status: Never    Smokeless tobacco: Never   Substance Use Topics    Alcohol use: Yes     Alcohol/week: 3.0 standard drinks of alcohol     Types: 3 Glasses of wine per week     Comment: per week     Drug use: No     Family History   Problem Relation Name Age of Onset    Glaucoma Mother      No Known Problems Father      No Known Problems Brother      No Known Problems Brother       OB History    Para Term  AB Living   0 0 0 0 0 0   SAB IAB Ectopic Multiple Live Births   0 0 0 0 0       /66   Ht 5' 5" (1.651 m)   Wt 66 kg (145 lb 9.8 oz)   LMP  (LMP Unknown)   BMI 24.23 kg/m²     ROS:  GENERAL: No fever, chills, fatigability or weight loss.  VULVAR: No pain, no lesions and no itching.  VAGINAL: No relaxation, + itching, no discharge, no abnormal bleeding and no lesions.  ABDOMEN: No abdominal pain. Denies nausea. Denies vomiting. No diarrhea. No constipation  BREAST: Denies pain. No lumps. No discharge.  URINARY: No incontinence, no nocturia, no frequency and no dysuria.  CARDIOVASCULAR: No chest pain. No shortness of breath. No leg cramps.  NEUROLOGICAL: No headaches. No vision changes.    PHYSICAL EXAM:  VULVA: normal appearing vulva with no masses, tenderness or lesions   VAGINA: normal appearing vagina with erythema. Thin white discharge, no lesions   CERVIX: normal appearing cervix without discharge or lesions     ASSESSMENT and PLAN:    " ICD-10-CM ICD-9-CM    1. Vaginal itching  N89.8 698.1 Vaginosis Screen by DNA Probe      fluconazole (DIFLUCAN) 150 MG Tab      nystatin-triamcinolone (MYCOLOG II) cream      2. Acute vaginitis  N76.0 616.10 Vaginosis Screen by DNA Probe      fluconazole (DIFLUCAN) 150 MG Tab      nystatin-triamcinolone (MYCOLOG II) cream      3. Screen for STD (sexually transmitted disease)  Z11.3 V74.5 C. trachomatis/N. gonorrhoeae by AMP DNA [facility]; Cervicovaginal      4. Other problems related to lifestyle  Z72.89 V69.8 C. trachomatis/N. gonorrhoeae by AMP DNA [facility]; Cervicovaginal        - AFFIRm and GC/CT collected  - Diflucan for suspected yeast  - Mycolog for external irritation   - No lesions noted- discussed coming in for swab if lesions occur    Patient was counseled today on vaginitis prevention including :  a. avoiding feminine products such as deoderant soaps, body wash, bubble bath, douches, scented toilet paper, deoderant tampons or pads, feminine wipes, chronic pad use, etc.  b. avoiding other vulvovaginal irritants such as long hot baths, humidity, tight, synthetic clothing, chlorine and sitting around in wet bathing suits  c. wearing cotton underwear, avoiding thong underwear and no underwear to bed  d. taking showers instead of baths and use a hair dryer on cool setting afterwards to dry  e. wearing cotton to exercise and shower immediately after exercise and change clothes  f. using polyurethane condoms without spermicide if sexually active and symptoms are triggered by intercourse    FOLLOW UP: PRN lack of improvement.      Angelica Adams, JASON GILBERT

## 2024-07-25 LAB
C TRACH DNA SPEC QL NAA+PROBE: NOT DETECTED
N GONORRHOEA DNA SPEC QL NAA+PROBE: NOT DETECTED

## 2024-07-26 ENCOUNTER — PATIENT MESSAGE (OUTPATIENT)
Dept: GASTROENTEROLOGY | Facility: CLINIC | Age: 34
End: 2024-07-26
Payer: COMMERCIAL

## 2024-07-26 ENCOUNTER — TELEPHONE (OUTPATIENT)
Dept: GASTROENTEROLOGY | Facility: CLINIC | Age: 34
End: 2024-07-26
Payer: COMMERCIAL

## 2024-07-26 NOTE — TELEPHONE ENCOUNTER
Called patient to confirm appointment scheduled for 7/29/24 at 8:30 am with Dr. Chau.  No answer, left voicemail, TWICE.  Also stated, if pt will be coming, to do questionnaire on portal, and to call back if they had any questions, or need to reschedule.     Third attempt to contact.

## 2024-07-29 ENCOUNTER — TELEPHONE (OUTPATIENT)
Dept: ENDOSCOPY | Facility: HOSPITAL | Age: 34
End: 2024-07-29
Payer: COMMERCIAL

## 2024-07-29 ENCOUNTER — OFFICE VISIT (OUTPATIENT)
Dept: GASTROENTEROLOGY | Facility: CLINIC | Age: 34
End: 2024-07-29
Payer: COMMERCIAL

## 2024-07-29 VITALS
BODY MASS INDEX: 24.61 KG/M2 | HEIGHT: 65 IN | OXYGEN SATURATION: 100 % | TEMPERATURE: 98 F | SYSTOLIC BLOOD PRESSURE: 116 MMHG | DIASTOLIC BLOOD PRESSURE: 82 MMHG | WEIGHT: 147.69 LBS | HEART RATE: 65 BPM

## 2024-07-29 DIAGNOSIS — K51.00 ULCERATIVE PANCOLITIS WITHOUT COMPLICATION: Primary | ICD-10-CM

## 2024-07-29 DIAGNOSIS — Z12.11 ENCOUNTER FOR SCREENING COLONOSCOPY FOR NON-HIGH-RISK PATIENT: Primary | ICD-10-CM

## 2024-07-29 DIAGNOSIS — K51.919 ULCERATIVE COLITIS WITH COMPLICATION, UNSPECIFIED LOCATION: Primary | ICD-10-CM

## 2024-07-29 DIAGNOSIS — R14.0 BLOATING: ICD-10-CM

## 2024-07-29 PROCEDURE — 3074F SYST BP LT 130 MM HG: CPT | Mod: CPTII,S$GLB,, | Performed by: INTERNAL MEDICINE

## 2024-07-29 PROCEDURE — 1160F RVW MEDS BY RX/DR IN RCRD: CPT | Mod: CPTII,S$GLB,, | Performed by: INTERNAL MEDICINE

## 2024-07-29 PROCEDURE — 1159F MED LIST DOCD IN RCRD: CPT | Mod: CPTII,S$GLB,, | Performed by: INTERNAL MEDICINE

## 2024-07-29 PROCEDURE — 99214 OFFICE O/P EST MOD 30 MIN: CPT | Mod: S$GLB,,, | Performed by: INTERNAL MEDICINE

## 2024-07-29 PROCEDURE — 3079F DIAST BP 80-89 MM HG: CPT | Mod: CPTII,S$GLB,, | Performed by: INTERNAL MEDICINE

## 2024-07-29 PROCEDURE — G2211 COMPLEX E/M VISIT ADD ON: HCPCS | Mod: S$GLB,,, | Performed by: INTERNAL MEDICINE

## 2024-07-29 PROCEDURE — 3008F BODY MASS INDEX DOCD: CPT | Mod: CPTII,S$GLB,, | Performed by: INTERNAL MEDICINE

## 2024-07-29 RX ORDER — SOD SULF/POT CHLORIDE/MAG SULF 1.479 G
12 TABLET ORAL DAILY
Qty: 24 TABLET | Refills: 0 | Status: SHIPPED | OUTPATIENT
Start: 2024-07-29

## 2024-07-29 NOTE — TELEPHONE ENCOUNTER
Spoke to pt to schedule procedure(s) Colonoscopy       Physician to perform procedure(s) Dr. CORNELL Chau  Date of Procedure (s) 10/18/24  Arrival Time 7:10 AM  Time of Procedure(s) 8:10 AM   Location of Procedure(s) [facility] [address]  Type of Rx Prep sent to patient: Sutab  Instructions provided to patient via [facility]    Patient was informed on the following information and verbalized understanding. Screening questionnaire reviewed with patient and complete. If procedure requires anesthesia, a responsible adult needs to be present to accompany the patient home, patient cannot drive after receiving anesthesia. Appointment details are tentative, especially check-in time. Patient will receive a prep-op call 7 days prior to confirm check-in time for procedure. If applicable the patient should contact their pharmacy to verify Rx for procedure prep is ready for pick-up. Patient was advised to call the scheduling department at [phone] if pharmacy states no Rx is available. Patient was advised to call the endoscopy scheduling department if any questions or concerns arise.      [facility] Scheduling Department

## 2024-07-29 NOTE — PROGRESS NOTES
IBD PATIENT INTAKE:    Confirm current PCP: Dr.Meredith Reid  If no PCP-  number given to establish [phone]: N/A    IBD THERAPY (name, dose/frequency):  Entyvio  Last dose:  07/18/2024    Next dose:  08/15/2024    Antibiotics (past 30 Days):  No  If yes   Indication:  Name of antibiotic:  Completion date:

## 2024-07-29 NOTE — TELEPHONE ENCOUNTER
"----- Message from Jason Chau MD sent at 2024  8:46 AM CDT -----  Procedure: Colonoscopy    Diagnosis: Ulcerative colitis    Procedure Timin-12 weeks    #If within 4 weeks selected, please selene as high priority#    #If greater than 12 weeks, please select "5-12 weeks" and delay sending until 3 months prior to requested date#     Location: Any Site    Additional Scheduling Information: No scheduling concerns    Prep Specifications:Standard prep    Is the patient taking a GLP-1 Agonist:no    Have you attached a patient to this message: yes  "

## 2024-07-29 NOTE — PROGRESS NOTES
[facility]          Inflammatory Bowel Disease Follow Up Note         TODAY'S VISIT DATE:  7/29/2024    Reason for Consult:    No chief complaint on file.      PCP: No, Primary Doctor      Referring MD:   No ref. provider found    History of Present Illness:  Prudence Castaneda who is a 33 y.o. female is being seen today at the [facility] on 07/29/2024 for inflammatory bowel disease- ulcerative colitis.   She feels like she is doing pretty well.  She is having 2-3 bowel movements a day.  Most the time her stools are formed but occasionally she has some soft stools.  She has not seen any blood in his stools.  She has not had any issues with urgency.  She has a little bit of increase in bloating issues.  She was also recently found to have elevated triglyceride levels.  She has been exercising more and try to lose weight.  She went for a period with decreasing her alcohol intake and noticed that her bloating issues improved a little bit during that time.  She feels like she has eaten very healthy recently as well.  She denies any problems with her Entyvio infusions.    IBD History:  She was diagnosed with ulcerative colitis in 2017.  At that time she was having bloody diarrhea as well as mucus in the stools.  She underwent a colonoscopy that revealed endoscopically active disease only involving the distal rectum although biopsies showed chronic inflammatory changes throughout the colon.  She was placed on Canasa suppositories which helped to some degree.  She was also treated with sulfasalazine.  I met her about a year ago because of ongoing symptoms.  At that time she was taking Apriso and Canasa.  She was having about 6-7 loose bowel movements a day in spite of taking medications.  Because of insurance issues there was some delay in getting her scoped to reassess her disease activity.  In September of last year she underwent a colonoscopy as part of  the pre enrollment evaluation for clinical trial for mirikizumab and she was found to have active inflammation (Cooper 2) in the distal 10 cm of the rectum but no endoscopically visible disease proximal to this.  Biopsies of the rectum showed chronic active inflammation but biopsies from the remainder of the colon did not show any evidence of inflammation.  She was taking Apriso at that time but was not taking Canasa.  She did not qualify for enrollment in the clinical trial and after discussion of treatment options she elected to start Entyvio.  She started this around October or November of last year.  She has continued to take Apriso and Canasa along with this.  Entyvio was increased to every 4 weeks in December of 2020 because of a low level in ongoing symptoms.  Apriso was stopped.  Since increasing the dose of Entyvio she has had a significant improvement.    IBD Details:  Dx Date:  2017  Disease type/distribution:  Ulcerative colitis/endoscopically active disease in the distal rectum but biopsies with pan colonic chronic inflammatory changes  Current Treatment:  Entyvio every 4 weeks Start Date:  Fall 2019/dose increase in December 2020 Response:  Good  Optimized:  Yes Adverse reactions:  None  Prior surgeries:  None  CRP Elevation:  Yes   calprotectin -elevated during active disease  Disease Complications:  None  Extraintestinal manifestations:  None  Prior treatments:   Steroids:  None  5ASA:  Incomplete response  IMM:  None  TNF Inh:  None   Anti-Integrin:  Currently taking   IL 12/23:  None  NICOLE Inh:  None    Previous Clinical Trials:  Attempted enrollment-did not qualify because of limited disease distribution.    Last Colonoscopy:  September 2019-proctitis involving the last 10 cm of the rectum.  Biopsy showed no active inflammation in the remainder of her colon.    Other Endoscopies:  None    Imaging:   MRE:  None   CT:  None   Other:  None    Pertinent Labs:  Lab Results   Component Value Date     "SEDRATE 0 05/01/2018    CRP 7.1 07/31/2023     No results found for: "TTGIGA", "IGA"  No results found for: "TSH", "FREET4"  Lab Results   Component Value Date    HYFHZMCK21YO 30 01/31/2024     Lab Results   Component Value Date    HEPBSAG Non-reactive 03/26/2024    HEPBCAB Non-reactive 01/31/2024    HEPCAB Non-reactive 03/26/2024     Lab Results   Component Value Date    VIS18UWQJ Non-reactive 03/26/2024     Lab Results   Component Value Date    NIL 0.18619 01/31/2024    MITOGENNIL 10.000 01/31/2024     No results found for: "TPTMINTERP", "TPMTRESULT"  No results found for: "STOOLCULTURE", "VAQCIEYUGU9Z", "VSEISNIIAX3U", "CDIFFICILEAN", "CDIFFTOX", "CDIFFICILEBY"  Lab Results   Component Value Date    CALPROTECTIN <27.1 09/21/2020       Therapeutic Drug Monitoring Labs:  No results found for: "PROMETH"  No results found for: "ANSADAINIT", "INFLIXIMAB", "INFLIXINTERP"    Vaccinations:  No results found for: "HEPBSAB"  No results found for: "HEPAIGG"  No results found for: "VARICELLAZOS", "VARICELLAINT"  Immunization History   Administered Date(s) Administered    COVID-19, MRNA, LN-S, PF (MODERNA FULL 0.5 ML DOSE) 03/29/2021, 04/26/2021    Influenza - Quadrivalent - PF *Preferred* (6 months and older) 02/06/2020, 10/13/2022, 09/21/2023    Influenza - Trivalent (ADULT) 01/09/2015    Influenza Split 01/09/2015    Tdap 02/13/2020         Review of Systems  Review of Systems   Constitutional:  Negative for chills, fever and weight loss.   HENT:  Negative for sore throat.    Eyes:  Negative for pain, discharge and redness.   Respiratory:  Negative for cough, shortness of breath and wheezing.    Cardiovascular:  Negative for chest pain, orthopnea and leg swelling.   Gastrointestinal:  Negative for abdominal pain, blood in stool, constipation, diarrhea, heartburn, melena, nausea and vomiting.   Genitourinary:  Negative for dysuria, frequency and urgency.   Musculoskeletal:  Negative for back pain, joint pain and myalgias. " "  Skin:  Negative for itching and rash.   Neurological:  Negative for focal weakness and seizures.   Endo/Heme/Allergies:  Does not bruise/bleed easily.   Psychiatric/Behavioral:  Negative for depression. The patient is not nervous/anxious.        Medical History:   Past Medical History:   Diagnosis Date    Ulcerative colitis        Surgical History:  Past Surgical History:   Procedure Laterality Date    APPENDECTOMY      COLONOSCOPY         Family History:   Family History   Problem Relation Name Age of Onset    Glaucoma Mother      No Known Problems Father      No Known Problems Brother      No Known Problems Brother         Social History:   Social History     Tobacco Use    Smoking status: Never    Smokeless tobacco: Never   Substance Use Topics    Alcohol use: Yes     Alcohol/week: 3.0 standard drinks of alcohol     Types: 3 Glasses of wine per week     Comment: per week     Drug use: No       Allergies: Reviewed    Home Medications:   Medication List with Changes/Refills   Current Medications    CLINDAMYCIN-BENZOYL PEROXIDE GEL    Apply topically.    DAYSEE 0.15 MG-30 MCG (84)/10 MCG (7) 3MPK    Take 1 tablet by mouth once daily.    ERGOCALCIFEROL (DRISDOL) 200 MCG/ML (8,000 UNIT/ML) DROP    Take by mouth.    FLUCONAZOLE (DIFLUCAN) 150 MG TAB    Take 1 tablet (150 mg total) by mouth every 72 hours as needed (yeast).    MULTIVITAMIN (THERAGRAN) PER TABLET    Take 1 tablet by mouth once daily.    NYSTATIN-TRIAMCINOLONE (MYCOLOG II) CREAM    Apply to affected area 2 times daily    SPIRONOLACTONE (ALDACTONE) 25 MG TABLET    Take 25 mg by mouth once daily.    VEDOLIZUMAB (ENTYVIO IV)    Inject into the vein every 28 days.        Physical Exam:  Vital Signs:  /82 (BP Location: Right arm, Patient Position: Sitting)   Pulse 65   Temp 97.7 °F (36.5 °C)   Ht 5' 5" (1.651 m)   Wt 67 kg (147 lb 11.3 oz)   LMP  (LMP Unknown)   SpO2 100%   BMI 24.58 kg/m²   Body mass index is 24.58 kg/m².    Physical " Exam  Vitals and nursing note reviewed.   Constitutional:       General: She is not in acute distress.     Appearance: Normal appearance. She is well-developed. She is not ill-appearing or toxic-appearing.   Eyes:      Conjunctiva/sclera: Conjunctivae normal.      Pupils: Pupils are equal, round, and reactive to light.   Neck:      Thyroid: No thyromegaly.   Cardiovascular:      Rate and Rhythm: Normal rate and regular rhythm.      Heart sounds: Normal heart sounds. No murmur heard.  Pulmonary:      Effort: Pulmonary effort is normal.      Breath sounds: Normal breath sounds. No wheezing or rales.   Abdominal:      General: Bowel sounds are normal. There is no distension.      Palpations: Abdomen is soft. There is no mass.      Tenderness: There is no abdominal tenderness.   Musculoskeletal:         General: No tenderness. Normal range of motion.      Right lower leg: No edema.      Left lower leg: No edema.   Lymphadenopathy:      Cervical: No cervical adenopathy.   Skin:     Findings: No erythema or rash.   Neurological:      General: No focal deficit present.      Mental Status: She is alert and oriented to person, place, and time.   Psychiatric:         Mood and Affect: Mood normal.         Behavior: Behavior normal.         Thought Content: Thought content normal.         Judgment: Judgment normal.       Telemedicine visit. Remainder of physical unable to be completed.    Labs: reviewed and pertinent noted above    Assessment/Plan:  Prudence Castaneda is a 33 y.o. female with ulcerative colitis with disease predominantly in the distal rectum. The following issues were addresssed:    1. Ulcerative pancolitis without complication    2. Bloating      1.  Ulcerative colitis:  She is doing well.  Continue Entyvio.  Update labs with next infusion.  Plan for colonoscopy when able to.      2. Bloating: We discussed dietary factors that may play a role.  We discussed a low FODMAP diet.  I would avoid lactose containing  foods, carbonated beverages, artificial sweeteners.  Discussed air swallowing.  We will also check celiac panel with next labs.      # IBD specific health maintenance:  Colon cancer surveillance:  Up-to-date    Annual:  - Eye exam:  Review in the future  - Skin exam (if on IMM/TNF):  Not applicable  - reminded pt to use sunblock/hats/sunprotective clothing  - PAP (if immunosuppressed):  Up-to-date    DEXA:  Not applicable    Vitamin D:  Previously on supplementation-recheck when possible    Vaccines:    Received pneumonia vaccine earlier this year.  Recommend annual flu shot.  Recommend shingles vaccinations.    Follow up: Follow up in about 6 months (around 1/29/2025).    Visit today is associated with current or anticipated ongoing medical care related to this patient's single serious condition/complex condition (ulcerative colitis).      Thank you again for sending Prudence Castaneda to see Dr. Sung Chau today at the [facility]. Please don't hesitate to contact Dr. Chau if there are any questions regarding this evaluation, or if you have any other patients with inflammatory bowel disease for whom you would like a consultation. You can reach Dr. Chau at [phone] or by email at [email or web address]    Jason Chau MD  Department of Gastroenterology  Inflammatory Bowel Disease

## 2024-10-11 ENCOUNTER — TELEPHONE (OUTPATIENT)
Dept: ENDOSCOPY | Facility: HOSPITAL | Age: 34
End: 2024-10-11
Payer: COMMERCIAL

## 2024-10-11 ENCOUNTER — PATIENT MESSAGE (OUTPATIENT)
Dept: ENDOSCOPY | Facility: HOSPITAL | Age: 34
End: 2024-10-11
Payer: COMMERCIAL

## 2024-10-11 NOTE — TELEPHONE ENCOUNTER
Contacted Pt for Endoscopy precall to confirm scheduled procedure(s) Colonoscopy on 10/18/24. Pt did not answer. Left voicemail for pt to call Endoscopy Scheduling to confirm.

## 2024-10-14 ENCOUNTER — PATIENT MESSAGE (OUTPATIENT)
Dept: GASTROENTEROLOGY | Facility: CLINIC | Age: 34
End: 2024-10-14
Payer: COMMERCIAL

## 2024-10-14 ENCOUNTER — TELEPHONE (OUTPATIENT)
Dept: ENDOSCOPY | Facility: HOSPITAL | Age: 34
End: 2024-10-14
Payer: COMMERCIAL

## 2024-10-14 NOTE — TELEPHONE ENCOUNTER
Contacted and spoke with patient to reschedule colonoscopy, patient informed next available would be on 12/20 for 2nd floor with  patient will call back once she is ready to schedule.Patient has been removed from the schedule.        Beena Mata, RN to [facility]    10/14/24 12:43 PM   Pls cancel and she will call back when she knows her schedule. Beena Burdick, RN to Prudence Tonya   [place]      10/14/24 12:43 PM  Juarez Foss,      Thank you for letting us know. It is concerning that you weren't able to get in touch with anyone. We will look into this.      Thank you,   Beena GUTIERREZ    Last read by Prudence Castaneda at 12:43 PM on 10/14/2024.  Prudence Castaneda to [facility]ER[facility] Staff (supporting Jason Chau MD)   LW      10/14/24 12:37 PM  I have tried to call multiple times but cannot get through to anyone. I need to cancel my colonoscopy on 10/18. I will try to call back to book another appointment when I can figure out scheduling. Thank you.

## 2024-10-29 ENCOUNTER — E-VISIT (OUTPATIENT)
Dept: OBSTETRICS AND GYNECOLOGY | Facility: CLINIC | Age: 34
End: 2024-10-29
Payer: COMMERCIAL

## 2024-10-29 DIAGNOSIS — N89.8 VAGINAL ITCHING: ICD-10-CM

## 2024-10-29 DIAGNOSIS — N76.0 ACUTE VAGINITIS: ICD-10-CM

## 2024-10-30 RX ORDER — FLUCONAZOLE 150 MG/1
150 TABLET ORAL
Qty: 2 TABLET | Refills: 0 | Status: SHIPPED | OUTPATIENT
Start: 2024-10-30

## 2025-01-16 ENCOUNTER — ON-DEMAND VIRTUAL (OUTPATIENT)
Dept: URGENT CARE | Facility: CLINIC | Age: 35
End: 2025-01-16
Payer: COMMERCIAL

## 2025-01-16 DIAGNOSIS — J32.9 SINUSITIS, UNSPECIFIED CHRONICITY, UNSPECIFIED LOCATION: Primary | ICD-10-CM

## 2025-01-16 RX ORDER — AZITHROMYCIN 250 MG/1
TABLET, FILM COATED ORAL
Qty: 6 TABLET | Refills: 0 | Status: SHIPPED | OUTPATIENT
Start: 2025-01-16

## 2025-01-16 RX ORDER — AZELASTINE 1 MG/ML
2 SPRAY, METERED NASAL 2 TIMES DAILY
Qty: 30 ML | Refills: 0 | Status: SHIPPED | OUTPATIENT
Start: 2025-01-16 | End: 2026-01-16

## 2025-01-16 NOTE — PROGRESS NOTES
Subjective:      Patient ID: Prudence Castaneda is a 34 y.o. female.    Vitals:  vitals were not taken for this visit.     Chief Complaint: Sinus Problem      Visit Type: TELE AUDIOVISUAL    Present with the patient at the time of consultation: TELEMED PRESENT WITH PATIENT: None, patient at work    Past Medical History:   Diagnosis Date    Ulcerative colitis      Past Surgical History:   Procedure Laterality Date    APPENDECTOMY      COLONOSCOPY       Review of patient's allergies indicates:  No Known Allergies  Current Outpatient Medications on File Prior to Visit   Medication Sig Dispense Refill    clindamycin-benzoyl peroxide gel Apply topically.      DAYSEE 0.15 mg-30 mcg (84)/10 mcg (7) 3MPk Take 1 tablet by mouth once daily. 90 each 3    multivitamin (THERAGRAN) per tablet Take 1 tablet by mouth once daily.      nystatin-triamcinolone (MYCOLOG II) cream Apply to affected area 2 times daily 30 g 1    spironolactone (ALDACTONE) 25 MG tablet Take 25 mg by mouth once daily.      vedolizumab (ENTYVIO IV) Inject into the vein every 28 days.       [DISCONTINUED] ergocalciferol (DRISDOL) 200 mcg/mL (8,000 unit/mL) Drop Take by mouth.      [DISCONTINUED] fluconazole (DIFLUCAN) 150 MG Tab Take 1 tablet (150 mg total) by mouth every 72 hours as needed (yeast). 2 tablet 0    [DISCONTINUED] sod sulf-pot chloride-mag sulf (SUTAB) 1.479-0.188- 0.225 gram tablet Take 12 tablets by mouth once daily. Please follow Endoscopy 's instructions. Please apply coupon code. Please apply coupon code. BIN: [id number], PCN: [id number], GROUP: [id number], MEMBER ID: [id number] 24 tablet 0     No current facility-administered medications on file prior to visit.     Family History   Problem Relation Name Age of Onset    Glaucoma Mother      No Known Problems Father      No Known Problems Brother      No Known Problems Brother         Medications Ordered                [facility]/pharmacy #[id number] - [place], [place] - [address]   [address]  [place] [address]    Telephone: [phone]   Fax: [phone]   Hours: Not open 24 hours                         E-Prescribed (2 of 2)              azelastine (ASTELIN) 137 mcg (0.1 %) nasal spray    Si sprays (274 mcg total) by Nasal route 2 (two) times daily.       Start: 25     Quantity: 30 mL Refills: 0                         azithromycin (Z-SARAN) 250 MG tablet    Sig: Take 2 tablets by mouth on day 1; Take 1 tablet by mouth on days 2-5       Start: 25     Quantity: 6 tablet Refills: 0                           Ohs Peq Odvv Intake    2025 12:51 PM CST - Filed by Patient   What is your current physical address in the event of a medical emergency? [address]   Are you able to take your vital signs? No   Please attach any relevant images or files    Is your employer contracted with [facility]? No         Sinus symptoms for 3.5 weeks, worsening. No relief with OTC meds. Seeking further treatment options.    Sinus Problem  Associated symptoms include congestion, coughing, headaches, sinus pressure and a sore throat. Pertinent negatives include no chills, ear pain (fullness) or shortness of breath.       Constitution: Negative for chills and fever.   HENT:  Positive for congestion, sinus pain, sinus pressure and sore throat. Negative for ear pain (fullness).    Respiratory:  Positive for cough and sputum production. Negative for shortness of breath and wheezing.    Gastrointestinal:  Negative for nausea, vomiting and diarrhea.   Musculoskeletal:  Negative for muscle ache.   Neurological:  Positive for headaches.        Objective:   The physical exam was conducted virtually.  Physical Exam   Constitutional: She is oriented to person, place, and time. She does not appear ill. No distress.   HENT:   Head: Normocephalic and atraumatic.   Nose: Right sinus exhibits frontal sinus tenderness. Left sinus exhibits frontal sinus tenderness.   Eyes: Extraocular movement intact    Pulmonary/Chest: Effort normal.   Abdominal: Normal appearance.   Musculoskeletal: Normal range of motion.         General: Normal range of motion.   Neurological: no focal deficit. She is alert and oriented to person, place, and time.   Psychiatric: Her behavior is normal. Mood normal.   Vitals reviewed.      Assessment:     1. Sinusitis, unspecified chronicity, unspecified location        Plan:   Patient encouraged to monitor symptoms closely and instructed to follow-up for new or worsening symptoms. Further, in-person, evaluation may be necessary for continued treatment. Please follow up with your primary care doctor or specialist as needed. Verbally discussed plan. Patient confirms understanding and is in agreement with treatment and plan.     You must understand that you've received a [facility] Care evaluation only and that you may be released before all your medical problems are known or treated. You, the patient, will arrange for follow up care as instructed.      Sinusitis, unspecified chronicity, unspecified location  -     azithromycin (Z-SARAN) 250 MG tablet; Take 2 tablets by mouth on day 1; Take 1 tablet by mouth on days 2-5  Dispense: 6 tablet; Refill: 0  -     azelastine (ASTELIN) 137 mcg (0.1 %) nasal spray; 2 sprays (274 mcg total) by Nasal route 2 (two) times daily.  Dispense: 30 mL; Refill: 0        Patient Instructions   OVER THE COUNTER RECOMMENDATIONS/SUGGESTIONS (IF NO CONTRAINDICATIONS).     ·         Make sure to stay well hydrated.     ·         Use Nasal Saline to mechanically move any post nasal drip from your eustachian tube or from the back of your throat.     ·         Use warm saltwater gargles to ease your throat pain. Warm saltwater gargles as needed for sore throat-  1/2 tsp salt to 1 cup warm water, gargle as desired. Warm fluids tend to relieve a sore throat.     .         Throat lozenges, Chloraseptic spray or other over the counter treatments are ok to use as well. Use as  directed.     ·         Use an antihistamine such as Claritin, Zyrtec or Allegra to dry you out.     ·         Use pseudoephedrine (behind the counter) to decongest. Pseudoephedrine  30 mg up to 240 mg /day. It can raise your blood pressure and give you palpitations.     ·         Use Mucinex (guaifenesin) to break up mucous up to 2400mg/day to loosen any mucous.     ·         The Mucinex DM pill has a cough suppressant that can be sedating. It can be used at night to stop the tickle at the back of your throat.     ·         You can use Mucinex D (it has guaifenesin and a high dose of pseudoephedrine) in the mornings to help decongest.     ·         Use Afrin (oxymetazoline) in each nare for no longer than 3 days, as it is addictive. It can also dry out your mucous membranes and cause elevated blood pressure. This is especially useful if you are flying.     ·         Use Flonase 1-2 sprays/nostril per day. It is a local acting steroid nasal spray, if you develop a bloody nose, stop using the medication immediately.     ·         Sometimes Nyquil at night is beneficial to help you get some rest, however it is sedating, and it does have an antihistamine, and Tylenol.     ·         Honey is a natural cough suppressant that can be used.     ·         Tylenol up to 4,000 mg a day is safe for short periods and can be used for body aches, pain, and fever. However, in high doses and prolonged use it can cause liver irritation.     ·         Ibuprofen is a non-steroidal anti-inflammatory that can be used for body aches, pain, and fever. However, it can also cause stomach irritation if overused.

## 2025-02-03 ENCOUNTER — TELEPHONE (OUTPATIENT)
Dept: GASTROENTEROLOGY | Facility: CLINIC | Age: 35
End: 2025-02-03
Payer: COMMERCIAL

## 2025-02-17 ENCOUNTER — TELEPHONE (OUTPATIENT)
Dept: GASTROENTEROLOGY | Facility: CLINIC | Age: 35
End: 2025-02-17
Payer: COMMERCIAL

## 2025-03-26 ENCOUNTER — TELEPHONE (OUTPATIENT)
Dept: PRIMARY CARE CLINIC | Facility: CLINIC | Age: 35
End: 2025-03-26
Payer: COMMERCIAL

## 2025-03-26 NOTE — TELEPHONE ENCOUNTER
----- Message from Abi sent at 3/26/2025  1:28 PM CDT -----  Caller is requesting an earlier appointment then we can schedule.  Caller is requesting a message be sent to the provider.If this is for urgent care symptoms, did you offer other providers at this location, providers at other locations, or [facility]? (yes, no, n/a):  n/aIf this is for the patients physical, did you offer to schedule next available and put on wait list, or to see NP or PA for their physical?  (yes, no, n/a):  n/aWhen is the next available appointment with their provider:  n/a Reason for the appointment:  est carePatient preference of timeframe to be scheduled:  Would the patient like a call back, or a response through their [facility] portal?:   Comments:  pt would like to est care with provider

## 2025-03-28 ENCOUNTER — TELEPHONE (OUTPATIENT)
Dept: GASTROENTEROLOGY | Facility: CLINIC | Age: 35
End: 2025-03-28
Payer: COMMERCIAL

## 2025-04-01 ENCOUNTER — TELEPHONE (OUTPATIENT)
Dept: GASTROENTEROLOGY | Facility: CLINIC | Age: 35
End: 2025-04-01
Payer: COMMERCIAL

## 2025-04-01 NOTE — TELEPHONE ENCOUNTER
----- Message from MATT Lee sent at 4/1/2025  1:38 PM CDT -----  Regarding: FW: Return Call  Contact: [phone]  Not sure if you were wanting to contact her back.Thank you!  ----- Message -----  From: Adrien Hilton  Sent: 3/31/2025  11:00 AM CDT  To: Aminata Momin Staff  Subject: Return Call                                      Type:  Patient Returning CallWho Called:The pt Who Left Message for Patient:Emily Sweet, RNes the patient know what this is regarding?:Schedule Appt Would the patient rather a call back or a response via [facility]? Call back Best Call Back Number: [phone] Information: Thank you.

## 2025-04-14 ENCOUNTER — TELEPHONE (OUTPATIENT)
Dept: GASTROENTEROLOGY | Facility: CLINIC | Age: 35
End: 2025-04-14
Payer: COMMERCIAL

## 2025-04-29 ENCOUNTER — TELEPHONE (OUTPATIENT)
Dept: PRIMARY CARE CLINIC | Facility: CLINIC | Age: 35
End: 2025-04-29
Payer: COMMERCIAL

## 2025-04-29 NOTE — TELEPHONE ENCOUNTER
----- Message from Sanchez sent at 4/29/2025 11:05 AM CDT -----  Contact: [phone]  Caller is requesting an earlier appointment then we can schedule.  Caller is requesting a message be sent to the provider.If this is for urgent care symptoms, did you offer other providers at this location, providers at other locations, or [facility]? (yes, no, n/a):  n/aIf this is for the patients physical, did you offer to schedule next available and put on wait list, or to see NP or PA for their physical?  (yes, no, n/a):  n/aWhen is the next available appointment with their provider:  NoneReason for the appointment:  Est Care Patient preference of timeframe to be scheduled:  As Soon As possible Would the patient like a call back, or a response through their [facility] portal?:   call backComments:

## 2025-05-03 RX ORDER — LEVONORGESTREL AND ETHINYL ESTRADIOL 150-30(84)
1 KIT ORAL
Qty: 91 TABLET | Refills: 0 | Status: SHIPPED | OUTPATIENT
Start: 2025-05-03

## 2025-05-03 NOTE — TELEPHONE ENCOUNTER
Refill Decision Note   Prudencemilady Castaneda  is requesting a refill authorization.  Brief Assessment and Rationale for Refill:  Approve     Medication Therapy Plan:       Medication Reconciliation Completed: No   Comments:     No Care Gaps recommended.     Note composed:5:09 AM 05/03/2025

## 2025-05-06 ENCOUNTER — OFFICE VISIT (OUTPATIENT)
Dept: GASTROENTEROLOGY | Facility: CLINIC | Age: 35
End: 2025-05-06
Payer: COMMERCIAL

## 2025-05-06 VITALS
DIASTOLIC BLOOD PRESSURE: 86 MMHG | TEMPERATURE: 98 F | BODY MASS INDEX: 25.3 KG/M2 | HEIGHT: 65 IN | HEART RATE: 83 BPM | SYSTOLIC BLOOD PRESSURE: 126 MMHG | OXYGEN SATURATION: 100 % | WEIGHT: 151.88 LBS

## 2025-05-06 DIAGNOSIS — K51.20 ULCERATIVE PROCTITIS WITHOUT COMPLICATION: Primary | ICD-10-CM

## 2025-05-06 DIAGNOSIS — Z79.60 IMMUNODEFICIENCY DUE TO LONG TERM IMMUNOSUPPRESSIVE DRUG THERAPY: ICD-10-CM

## 2025-05-06 DIAGNOSIS — T45.1X5A IMMUNODEFICIENCY DUE TO LONG TERM IMMUNOSUPPRESSIVE DRUG THERAPY: ICD-10-CM

## 2025-05-06 DIAGNOSIS — D84.821 IMMUNODEFICIENCY DUE TO LONG TERM IMMUNOSUPPRESSIVE DRUG THERAPY: ICD-10-CM

## 2025-05-06 PROCEDURE — G2211 COMPLEX E/M VISIT ADD ON: HCPCS | Mod: S$GLB,,, | Performed by: INTERNAL MEDICINE

## 2025-05-06 PROCEDURE — 1159F MED LIST DOCD IN RCRD: CPT | Mod: CPTII,S$GLB,, | Performed by: INTERNAL MEDICINE

## 2025-05-06 PROCEDURE — 3079F DIAST BP 80-89 MM HG: CPT | Mod: CPTII,S$GLB,, | Performed by: INTERNAL MEDICINE

## 2025-05-06 PROCEDURE — 99999 PR PBB SHADOW E&M-EST. PATIENT-LVL IV: CPT | Mod: PBBFAC,,, | Performed by: INTERNAL MEDICINE

## 2025-05-06 PROCEDURE — 3008F BODY MASS INDEX DOCD: CPT | Mod: CPTII,S$GLB,, | Performed by: INTERNAL MEDICINE

## 2025-05-06 PROCEDURE — 3074F SYST BP LT 130 MM HG: CPT | Mod: CPTII,S$GLB,, | Performed by: INTERNAL MEDICINE

## 2025-05-06 PROCEDURE — 99215 OFFICE O/P EST HI 40 MIN: CPT | Mod: S$GLB,,, | Performed by: INTERNAL MEDICINE

## 2025-05-06 NOTE — PATIENT INSTRUCTIONS
- establish if you prefer with dermatology at [facility] and make sure they do full body skin check and can take over acne care  - labs in next 1 week- all ordered today  - start vit B12 1000 mcg 3 times/week  - be really good about your diet strict 2 weeks- if not better then start align 1 capsule daily, other ones that are expensive but good SEED, thrive  - if in 4-6 weeks things are not improving let me know and we will consider SIBO empiric antibiotics   - continue entyvio as scheduled  - we will space interval b/w infusions depending on 7/2025 drug levels

## 2025-05-06 NOTE — PROGRESS NOTES
[facility]             Inflammatory Bowel Disease   Follow-up Note              TODAY'S VISIT DATE:  5/6/2025    Chief Complaint:   Chief Complaint   Patient presents with    Ulcerative Colitis     NP/ Bloating with no GI pain     PCP: No, Primary Doctor    Previous History:  Prudence Castaneda is a 34 y.o. female with ulcerative colitis (pancolitis (rectum, microscopic pancolitis) who was doing well until 3/2017 when she developed BRBPR, borbyrygmi, mucus with blood though some looser stool, bloating, flatulence and per notes exposure to accutane in the past.  Colonoscopy 3/23/17 significant for granularity, friability, erythema with ulcers in the rectum with granularity, friability erythema and friability with erythema in the appendiceal orifice (biopsies TI normal, appnediceal orifice friability and erythema, normal ascending colon and descending colon, inflammation in the rectum. Pt started on lialda 1.2 g/d and probiotics and by visit 4/12/17 pt had HA and was switched to apriso 1.5 g/d. By her visit 8/15/17 pt was on sulfaslazine 500 mg po QID (2 g/d) though notes at visit 3/1/18 mentions sulfaslazine 1 gram TID (3 g/d). Pt generally is adherent.  CT A/P 5/2018 significant for appendicitis and wall thickening involving the cecum and proximal ascending colon most representative of reactive colitis from appendicitis.  In 5/2018 pt underwent appendectomy with op note/path c/w appendicitis. . In approximately 2018 she started apriso 0.375 g/d.  She then established with Dr. Chau at [facility] on 6/7/19 at which time she had 6-7 loose BMs/d w/ blood/mucus/urgency. Pt started canasa supp qhs and apriso increased to 1.5 g/d. At that time she also tried elimination diet that was not effective. Colonoscopy 9/19/19 significant for perianal skin tags, inflammation in a continuous and circumferential pattern in the rectum extending to 10 cm proximal to the anus consistent with Cooper score 2 and from  "proximal rectum to cecum normal (biopsies ascending/transverse/descending/sigmoid normal, rectum with chronic active colitis.  Patient started on Entyvio 10/2019. In 9/2020 pt had entyvio levels 13/Abs neg and stool calprotectin 9/21/20 normal.  In 12/2020 entyvio optimized to 300 mg IV q 4 weeks due to "low" levels and ongoing symptoms. On 10/1/24 pt switched her care from Dr. Chau to Dr. Gomez at [facility]. She at that time was having 2-3 BMs/d with variable consistency and some urgency with bloating and lower pelvic tightness.  EGD 10/17/24 for epigastric abdominal pain significant for mild HP neg chronic inactive gastritis, duodenum biopsies nromal and colonoscopy significant for normal colon and TI.  Biopsies ascending/transverse/descending/sigmoid/rectum normal. Stool calprotectin 10/2024 normal.  At her visit 11/2024 due to continued loose stool and bloating it was recommended to add fiber supplementation and consider amitripytline.  She continued on entyvio every 4 weeks.       Interval History:  - current IBD meds: Entyvio 300 mg IV q 4 weeks (started 10/2019, 12/2020 300 mg IV q 4 weeks, LD 4/29, ND 5/28)- optum  - diet: healthy diet- high protein, fruits/veggies, grains, lactose intolerance   - 2-3 soft Bowel Movements/day, no blood, no urgency  - oily/greasy food, crawfish  - bloating getting worse  - during [facility] 4/7-4/17/25- lactaid but ate most things while there  - bilateral wrist pain R>L 2024- steroid injections helped and now moved into elbow    Past Medical History[1]    Prior Pertinent Surgeries:   5/2018 appendectomy for appendicitis    Last pertinent Endoscopy/Imaging:  10/17/24 EGD (epigastric abdominal pain significant for mild HP neg chronic inactive gastritis, duodenum biopsies nromal   10/17/24 colonoscopy: normal colon and TI.  Biopsies ascending/transverse/descending/sigmoid/rectum normal.     Therapeutic Drug Monitoring Labs:  9/2020 entyvio levels 13/Abs " "neg    Vaccinations:  No results found for: "HEPBSAB"  No results found for: "HEPBSURFABQU"  No results found for: "HEPAIGG"  No results found for: "VARICELLAZOS", "VARICELLAINT"  No results found for: "MUMPSIGGSCRE", "MUMPSIGGINTE"   No results found for: "RUBEOLAIGGAN", "RUBEOLAINTER"  Immunization History   Administered Date(s) Administered    COVID-19, MRNA, LN-S, PF (MODERNA FULL 0.5 ML DOSE) 03/29/2021, 04/26/2021    Influenza - Quadrivalent - PF *Preferred* (6 months and older) 02/06/2020, 10/13/2022, 09/21/2023    Influenza - Trivalent - Afluria, Fluzone MDV 01/09/2015    Influenza - Trivalent - Fluarix, Flulaval, Fluzone, Afluria - PF 11/07/2024    Influenza Split 01/09/2015    Pneumococcal Conjugate - 20 Valent 05/06/2024    Tdap 02/13/2020   Flu shot: recommended yearly   COVID vaccine/booster:  per CDC recommendations  RSV: recommended at age 61 yo  Tetanus (Tdap): recommended every 10 years, due 2/2030  HPV: recommended if she has not had  Meningococcal: note sure, NA  Hepatitis B:  will check immunity  Hepatitis A:  will check immunity  MMR (live vaccine): will check immunity    Chickenpox status/Varicella (live vaccine): will check immunity   Shingrix: recommended, will discuss at next visit    Review of Systems: see pertinent review of systems above    Medications Ordered Prior to Encounter[2]    Physical Examination  /86 (BP Location: Right arm, Patient Position: Sitting)   Pulse 83   Temp 97.9 °F (36.6 °C)   Ht 5' 5" (1.651 m)   Wt 68.9 kg (151 lb 14.4 oz)   SpO2 100%   BMI 25.28 kg/m²    Constitutional: well developed, no cough, no dyspnea, alert, and no acute distress    Head: Normocephalic, no lesions, without obvious abnormality  Eye: Normal external eye, conjunctiva, and lids  Cardiovascular: regular rate and regular rhythm  Respiratory: normal air entry, CTA B  Gastrointestinal: soft, lower abdominal tenderness, non-distended, normal BS  Psychiatric: appropriate, normal " "mood    Labs:   Lab Results   Component Value Date    CRP 3.3 08/15/2024    CALPROTECTIN <27.1 09/21/2020     Lab Results   Component Value Date    HEPBSAG Non-reactive 03/26/2024    HEPBCAB Non-reactive 01/31/2024   ,   Lab Results   Component Value Date    TBGOLDPLUS Negative 01/31/2024     Lab Results   Component Value Date    TAHHIHJY18TJ 30 01/31/2024     Lab Results   Component Value Date    WBC 6.42 08/15/2024    HGB 13.9 08/15/2024    HCT 40.4 08/15/2024    MCV 89 08/15/2024     08/15/2024     Lab Results   Component Value Date    CREATININE 0.8 08/15/2024    ALBUMIN 3.7 08/15/2024    BILITOT 0.6 08/15/2024    ALKPHOS 45 (L) 08/15/2024    AST 30 08/15/2024    ALT 35 08/15/2024     Assessment/Plan:  Prudence Castaneda is a 34 y.o. female with ulcerative colitis (proctitis with cecal patch)    I had a long discussion with the patient today regarding her diagnosis and phenotype of ulcerative colitis after review of extensive records since onset of diagnosis.  She has been on entyvio every 4 weeks for some time but upon review it looks like she was optimized to entyvio every 8 weeks despite having biochemical and endoscopic remission along with therapeutic trough VDZ 13.  She was escalated per records due to "low" levels and ongoing symptoms though after better understanding her symptoms I do believe this is related to IBS and more visceral hypersensitivity rather than active inflammation. We discussed epidemiology, etiology, triggers and discussed management goals and treatment options for both her IBD and IBS symptoms including particular attention to her bloating. Based on review I do believe that we can consider deescalating the interval of her entyvio based on drug levels an reassessing sx and stool calprotectin.     # Ulcerative colitis (proctitis with cecal patch):  - continue entyvio 300 mg IV q 4 weeks with plans to increase interval slowly with data points including stool calprotectin and VDZ " levels  - pt to notify us of any change in bowel habits and we reviewed symptoms of IBS vs tenesmus ass with proctitis symptoms  - avoid NSAIDs, narcotics   - stool calprotectin normal 10/2024, repeat 10/2025  - drug monitoring labs: CBC/CMP q 6 mos (due now), TPMT (23.9 -normal 24--1/10/17), TB Quantiferon (neg 1/2024, repeat now), Hep B testing (neg 1-3/2024, repeat now)  - TDM: trough VDZ level 7/2025- delayed until then due to recent delayed dose     # IBS with bloating:  - food intolerances- avoid lactose, high fiber diet , eliminate fiber 1 bars, will focus on mediterranean diet and for now will avoid low fodmaps diet  - bloating- dietary discussion that pt is already implementing but will be more strict, start alighn 1 capsule daily but if not effective in 4-6 weeks pt will contact us and we will consider empiric SIBO treatment with antibiotics    # Anxiety  - gets therapy and advised that we will be getting psychology services soon    # Immunodeficiency due to long term immunosuppressive drug therapy  and IBD specific health maintenance:  CRC risk- RFs- sx 2017, <1/3 colon involved, no family hx CRC, average risk   Skin exam yearly -  normal 2024, external dermatology  Risk for osteopenia/osteoporosis- none  Pap smear- h/o abnormal pap smear/HPV + 2024, normal colposcopy 2024, yearly recommended, followed by gyn  Vitamin D- normal 1/2024  Lab Results   Component Value Date    [id number] 30 01/31/2024   Vaccines- no live vaccines, will check immunity to hep A, B, MMR, varicella, will review and advise vaccines at next visit.     Total time: 60 min    Visit today is associated with current or anticipated ongoing medical care related to this patient's single serious condition/complex condition ulcerative colitis.     Follow up in about 6 months (around 11/6/2025) for in person.    Merrill Coates MD, FACG, [facility]  Department of Gastroenterology  Medical Director, Inflammatory Bowel Disease                     [1]   Past Medical History:  Diagnosis Date    Ulcerative colitis    [2]   Current Outpatient Medications on File Prior to Visit   Medication Sig Dispense Refill    clindamycin-benzoyl peroxide gel Apply topically.      DAYSEE 0.15 mg-30 mcg (84)/10 mcg (7) 3MPk TAKE 1 TABLET BY MOUTH EVERY DAY 91 tablet 0    multivitamin (THERAGRAN) per tablet Take 1 tablet by mouth once daily.      azelastine (ASTELIN) 137 mcg (0.1 %) nasal spray 2 sprays (274 mcg total) by Nasal route 2 (two) times daily. (Patient not taking: Reported on 5/6/2025) 30 mL 0    vedolizumab (ENTYVIO IV) Inject 300 mg into the vein every 28 days.      [DISCONTINUED] azithromycin (Z-SARAN) 250 MG tablet Take 2 tablets by mouth on day 1; Take 1 tablet by mouth on days 2-5 6 tablet 0    [DISCONTINUED] nystatin-triamcinolone (MYCOLOG II) cream Apply to affected area 2 times daily (Patient not taking: Reported on 5/6/2025) 30 g 1    [DISCONTINUED] spironolactone (ALDACTONE) 25 MG tablet Take 25 mg by mouth once daily. (Patient not taking: Reported on 5/6/2025)       No current facility-administered medications on file prior to visit.

## 2025-05-12 ENCOUNTER — LAB VISIT (OUTPATIENT)
Dept: LAB | Facility: OTHER | Age: 35
End: 2025-05-12
Attending: INTERNAL MEDICINE
Payer: COMMERCIAL

## 2025-05-12 ENCOUNTER — RESULTS FOLLOW-UP (OUTPATIENT)
Dept: GASTROENTEROLOGY | Facility: CLINIC | Age: 35
End: 2025-05-12

## 2025-05-12 DIAGNOSIS — K51.20 ULCERATIVE PROCTITIS WITHOUT COMPLICATION: ICD-10-CM

## 2025-05-12 LAB
ABSOLUTE EOSINOPHIL (OHS): 0.08 K/UL
ABSOLUTE MONOCYTE (OHS): 0.5 K/UL (ref 0.3–1)
ABSOLUTE NEUTROPHIL COUNT (OHS): 5.95 K/UL (ref 1.8–7.7)
ALBUMIN SERPL BCP-MCNC: 4.5 G/DL (ref 3.5–5.2)
ALP SERPL-CCNC: 46 UNIT/L (ref 40–150)
ALT SERPL W/O P-5'-P-CCNC: 29 UNIT/L (ref 10–44)
ANION GAP (OHS): 10 MMOL/L (ref 8–16)
AST SERPL-CCNC: 20 UNIT/L (ref 11–45)
BASOPHILS # BLD AUTO: 0.05 K/UL
BASOPHILS NFR BLD AUTO: 0.6 %
BILIRUB SERPL-MCNC: 0.7 MG/DL (ref 0.1–1)
BUN SERPL-MCNC: 13 MG/DL (ref 6–20)
CALCIUM SERPL-MCNC: 10 MG/DL (ref 8.7–10.5)
CHLORIDE SERPL-SCNC: 102 MMOL/L (ref 95–110)
CO2 SERPL-SCNC: 24 MMOL/L (ref 23–29)
CREAT SERPL-MCNC: 0.9 MG/DL (ref 0.5–1.4)
ERYTHROCYTE [DISTWIDTH] IN BLOOD BY AUTOMATED COUNT: 12 % (ref 11.5–14.5)
GFR SERPLBLD CREATININE-BSD FMLA CKD-EPI: >60 ML/MIN/1.73/M2
GLUCOSE SERPL-MCNC: 91 MG/DL (ref 70–110)
HAV AB SER QL IA: REACTIVE
HBV CORE AB SERPL QL IA: NORMAL
HBV SURFACE AG SERPL QL IA: NORMAL
HCT VFR BLD AUTO: 44.1 % (ref 37–48.5)
HGB BLD-MCNC: 14.9 GM/DL (ref 12–16)
IMM GRANULOCYTES # BLD AUTO: 0.04 K/UL (ref 0–0.04)
IMM GRANULOCYTES NFR BLD AUTO: 0.5 % (ref 0–0.5)
LYMPHOCYTES # BLD AUTO: 2.23 K/UL (ref 1–4.8)
MCH RBC QN AUTO: 30.2 PG (ref 27–31)
MCHC RBC AUTO-ENTMCNC: 33.8 G/DL (ref 32–36)
MCV RBC AUTO: 90 FL (ref 82–98)
NUCLEATED RBC (/100WBC) (OHS): 0 /100 WBC
PLATELET # BLD AUTO: 290 K/UL (ref 150–450)
PMV BLD AUTO: 9.6 FL (ref 9.2–12.9)
POTASSIUM SERPL-SCNC: 3.7 MMOL/L (ref 3.5–5.1)
PROT SERPL-MCNC: 8.2 GM/DL (ref 6–8.4)
RBC # BLD AUTO: 4.93 M/UL (ref 4–5.4)
RELATIVE EOSINOPHIL (OHS): 0.9 %
RELATIVE LYMPHOCYTE (OHS): 25.2 % (ref 18–48)
RELATIVE MONOCYTE (OHS): 5.6 % (ref 4–15)
RELATIVE NEUTROPHIL (OHS): 67.2 % (ref 38–73)
SODIUM SERPL-SCNC: 136 MMOL/L (ref 136–145)
WBC # BLD AUTO: 8.85 K/UL (ref 3.9–12.7)

## 2025-05-12 PROCEDURE — 85025 COMPLETE CBC W/AUTO DIFF WBC: CPT

## 2025-05-12 PROCEDURE — 86704 HEP B CORE ANTIBODY TOTAL: CPT

## 2025-05-12 PROCEDURE — 86706 HEP B SURFACE ANTIBODY: CPT

## 2025-05-12 PROCEDURE — 87340 HEPATITIS B SURFACE AG IA: CPT

## 2025-05-12 PROCEDURE — 36415 COLL VENOUS BLD VENIPUNCTURE: CPT

## 2025-05-12 PROCEDURE — 86787 VARICELLA-ZOSTER ANTIBODY: CPT

## 2025-05-12 PROCEDURE — 86480 TB TEST CELL IMMUN MEASURE: CPT

## 2025-05-12 PROCEDURE — 86765 RUBEOLA ANTIBODY: CPT

## 2025-05-12 PROCEDURE — 86790 VIRUS ANTIBODY NOS: CPT

## 2025-05-12 PROCEDURE — 84450 TRANSFERASE (AST) (SGOT): CPT

## 2025-05-12 PROCEDURE — 86735 MUMPS ANTIBODY: CPT

## 2025-05-12 PROCEDURE — 86762 RUBELLA ANTIBODY: CPT

## 2025-05-13 LAB
HBV SURFACE AB SER-ACNC: 19.83 MIU/ML
HBV SURFACE AB SERPL IA-ACNC: REACTIVE M[IU]/ML
MITOGEN MINUS NIL (OHS): 5.56
MUMPS IGG (OHS): 18.2 AU/ML
MUMPS IGG INTERPRETATION (OHS): POSITIVE
NIL TB SYNCED (OHS): 0.01
QUANTIFERON GOLD INTERP (OHS): NEGATIVE
RUBEOLA (MEASLES) IGG (OHS): >300 AU/ML
RUBEOLA IGG INTERP. (OHS): POSITIVE
RUBV IGG SER-ACNC: 25.6 IU/ML
RUBV IGG SER-IMP: REACTIVE
TB1 AG MINUS NIL (OHS): 0.03
TB2 AG MINUS NIL (OHS): 0
V.ZOSTER IGG INTERP (OHS): POSITIVE
VARICELLA ZOSTER IGG (OHS): 11.5 S/CO

## 2025-06-23 ENCOUNTER — OFFICE VISIT (OUTPATIENT)
Dept: INTERNAL MEDICINE | Facility: CLINIC | Age: 35
End: 2025-06-23
Payer: COMMERCIAL

## 2025-06-23 VITALS
DIASTOLIC BLOOD PRESSURE: 81 MMHG | HEART RATE: 80 BPM | SYSTOLIC BLOOD PRESSURE: 135 MMHG | BODY MASS INDEX: 24.94 KG/M2 | WEIGHT: 149.69 LBS | HEIGHT: 65 IN | OXYGEN SATURATION: 100 %

## 2025-06-23 DIAGNOSIS — Z00.00 ROUTINE HEALTH MAINTENANCE: ICD-10-CM

## 2025-06-23 DIAGNOSIS — K51.20 ULCERATIVE PROCTITIS WITHOUT COMPLICATION: Primary | ICD-10-CM

## 2025-06-23 PROCEDURE — 3008F BODY MASS INDEX DOCD: CPT | Mod: CPTII,S$GLB,, | Performed by: FAMILY MEDICINE

## 2025-06-23 PROCEDURE — 99395 PREV VISIT EST AGE 18-39: CPT | Mod: S$GLB,,, | Performed by: FAMILY MEDICINE

## 2025-06-23 PROCEDURE — 3075F SYST BP GE 130 - 139MM HG: CPT | Mod: CPTII,S$GLB,, | Performed by: FAMILY MEDICINE

## 2025-06-23 PROCEDURE — 3079F DIAST BP 80-89 MM HG: CPT | Mod: CPTII,S$GLB,, | Performed by: FAMILY MEDICINE

## 2025-06-23 PROCEDURE — 1159F MED LIST DOCD IN RCRD: CPT | Mod: CPTII,S$GLB,, | Performed by: FAMILY MEDICINE

## 2025-06-23 PROCEDURE — 99999 PR PBB SHADOW E&M-EST. PATIENT-LVL III: CPT | Mod: PBBFAC,,, | Performed by: FAMILY MEDICINE

## 2025-06-23 NOTE — PROGRESS NOTES
Subjective:       Patient ID: Prudence Castaneda is a 34 y.o. female.    Chief Complaint: No chief complaint on file.    HPI  History of Present Illness    CHIEF COMPLAINT:  Prudence presents today for evaluation of joint pain.    HISTORY OF PRESENT ILLNESS:  She reports joint pain involving wrists, elbows, and knees. One year ago, she experienced significant wrist pain that was treated effectively with steroid injections. Currently, she experiences bilateral joint pain, primarily in the right elbow and left knee. She reports frustration with pain limiting her ability to work out and perform daily activities. She previously attempted physical therapy but was unable to continue due to insurance constraints.    MUSCULOSKELETAL - ELBOW:  She experiences bilateral elbow pain, predominantly in the right elbow, described as aching and intermittent. She reports pins and needles sensation when sleeping. Pain is exacerbated by biking, and she attempts to minimize weight-bearing on elbows.    MUSCULOSKELETAL - KNEE:  She experiences bilateral knee pain, primarily in the left knee, located in posterior knee tendons. She describes feeling of knee hyperextension on some days. She denies any history of knee injury. She uses compression band and compression socks for management. Pain is more noticeable with prolonged standing at work.    EXERCISE:  She maintains a consistent exercise routine approximately four days per week, including machine weights and free lifting with light weights. She cycles up to 10 miles. She modifies exercise techniques, such as changing  positions during activities, to manage potential strain. She notes hamstring curls cause discomfort at the back of the knee.    OCCUPATIONAL HISTORY:  She has been working in a position requiring prolonged standing for 14 years, which has historically caused knee soreness. Current knee discomfort is more localized compared to previous generalized knee pain during periods of  "increased work activity.    MEDICAL HISTORY:  She has a history of elevated triglycerides. She receives monthly Entyvio infusions with potential consideration for tapering down dosage in near future.      ROS:  General: -fever, -chills, -fatigue, -weight gain, -weight loss  Eyes: -vision changes, -redness, -discharge  ENT: -ear pain, -nasal congestion, -sore throat  Cardiovascular: -chest pain, -palpitations, -lower extremity edema  Respiratory: -cough, -shortness of breath  Gastrointestinal: -abdominal pain, -nausea, -vomiting, -diarrhea, -constipation, -blood in stool  Genitourinary: -dysuria, -hematuria, -frequency  Musculoskeletal: +joint pain, -muscle pain, +pain with movement, +limb pain, +muscle stiffness, +joint stiffness, +upper extremity swelling  Skin: -rash, -lesion  Neurological: -headache, -dizziness, +numbness, -tingling  Psychiatric: -anxiety, -depression, -sleep difficulty           All of your core healthy metrics are met.      Social History     Social History Narrative            Family History   Problem Relation Name Age of Onset    Glaucoma Mother      No Known Problems Father      No Known Problems Brother      No Known Problems Brother      Prostate cancer Paternal Grandfather       Current Medications[1]    Review of Systems    Objective:   /81 (BP Location: Left arm, Patient Position: Sitting)   Pulse 80   Ht 5' 5" (1.651 m)   Wt 67.9 kg (149 lb 11.1 oz)   LMP 05/07/2025 (Approximate)   SpO2 100%   BMI 24.91 kg/m²      Physical Exam  Constitutional:       General: She is not in acute distress.     Appearance: She is well-developed. She is not diaphoretic.   HENT:      Head: Normocephalic and atraumatic.      Nose: Nose normal.   Eyes:      General:         Right eye: No discharge.         Left eye: No discharge.      Conjunctiva/sclera: Conjunctivae normal.      Pupils: Pupils are equal, round, and reactive to light.   Neck:      Thyroid: No thyromegaly.   Cardiovascular:    "   Rate and Rhythm: Normal rate and regular rhythm.      Heart sounds: No murmur heard.  Pulmonary:      Effort: Pulmonary effort is normal. No respiratory distress.      Breath sounds: Normal breath sounds.   Abdominal:      General: There is no distension.      Palpations: Abdomen is soft.   Musculoskeletal:      Comments: Some TTP at the attachment of medial epicondyle on right arm.  Also some tenderness of medial hamstring tendon but no effusion or other knee pathology   Skin:     Findings: No rash.   Neurological:      Mental Status: She is alert and oriented to person, place, and time.   Psychiatric:         Behavior: Behavior normal.         Physical Exam              @resultssec@  Assessment & Plan   Assessment & Plan    IMPRESSION:   Assessed joint pain in right elbow and left knee, likely tendinitis and hamstring tendinitis respectively.   Recommend conservative management with stretching, exercises, and topical anti-inflammatory before considering steroid injections.   Evaluated previous triglyceride elevation, noting potential fluctuation based on recent diet.   Reviewed recent lab work, determining only lipid panel needed at this time.    PLAN SUMMARY:   Ordered lipid panel   Advised hamstring stretching and self-massage techniques   Continue Mediterranean diet   Recommend strengthening exercises for right elbow tendinitis   Suggested compression band for tendon strain relief   Prescribed topical anti-inflammatory medication   Advised adjusting bicycle handlebar position   Instructed to avoid locking knees while standing    ## RIGHT ELBOW PAIN AND TENDINITIS:   Assessed patient's right elbow pain (presenting as aching with pins and needles sensation during sleep and biking) as tendinitis, likely related to heavy lifting or cycling posture.   Explained the differences between lateral epicondylitis (tennis elbow) and medial epicondylitis (golfer's elbow).   Recommend a compression band with rubber pull for  tendon strain relief, to be worn for a few weeks.   Prescribed topical anti-inflammatory medication and suggested adjusting bicycle handlebar position to reduce elbow strain.    ## HAMSTRING AND KNEE CARE:   Recommend strengthening exercises including single-leg deadlifts with light dumbbells and light-weight hamstring curls to improve strength and stability.   Advised focusing on hamstring stretching and using foam rolling or a lacrosse ball for self-massage.   Instructed patient to avoid locking knees while standing.    ## GENERAL HEALTH MANAGEMENT:   Continue following Mediterranean diet.   Ordered lipid panel.         Problem List Items Addressed This Visit          GI    Ulcerative colitis (proctitis with cecal patch) - Primary     Other Visit Diagnoses         Routine health maintenance        Relevant Orders    Lipid Panel              Immunizations Administered on Date of Encounter - 6/23/2025       No immunizations on file.             No follow-ups on file.    This note was generated with the assistance of ambient listening technology. Verbal consent was obtained by the patient and accompanying visitor(s) for the recording of patient appointment to facilitate this note. I attest to having reviewed and edited the generated note for accuracy, though some syntax or spelling errors may persist. Please contact the author of this note for any clarification.      Disclaimer:  This note may have been prepared using voice recognition software, it may have not been extensively proofed, as such there could be errors within the text such as sound alike errors.         [1]   Current Outpatient Medications:     clindamycin-benzoyl peroxide gel, Apply topically., Disp: , Rfl:     DAYSEE 0.15 mg-30 mcg (84)/10 mcg (7) 3MPk, TAKE 1 TABLET BY MOUTH EVERY DAY, Disp: 91 tablet, Rfl: 0    multivitamin (THERAGRAN) per tablet, Take 1 tablet by mouth once daily., Disp: , Rfl:     vedolizumab (ENTYVIO IV), Inject 300 mg into the  vein every 28 days., Disp: , Rfl:

## 2025-07-30 PROCEDURE — 80280 DRUG ASSAY VEDOLIZUMAB: CPT | Performed by: INTERNAL MEDICINE

## 2025-08-02 NOTE — TELEPHONE ENCOUNTER
Refill Routing Note   Medication(s) are not appropriate for processing by [facility] for the following reason(s):        Patient not seen by provider within 15 months    [facility] action(s):  Defer             Appointments  past 12m or future 3m with PCP    Date Provider   Last Visit   10/29/2024 Tammy Yap MD   Next Visit   Visit date not found Tammy Yap MD   ED visits in past 90 days: 0        Note composed:12:35 PM 08/02/2025

## 2025-08-04 ENCOUNTER — TELEPHONE (OUTPATIENT)
Dept: OBSTETRICS AND GYNECOLOGY | Facility: CLINIC | Age: 35
End: 2025-08-04
Payer: COMMERCIAL

## 2025-08-05 RX ORDER — LEVONORGESTREL AND ETHINYL ESTRADIOL 150-30(84)
1 KIT ORAL
Qty: 91 TABLET | Refills: 0 | Status: SHIPPED | OUTPATIENT
Start: 2025-08-05

## 2025-08-06 ENCOUNTER — TELEPHONE (OUTPATIENT)
Dept: GASTROENTEROLOGY | Facility: CLINIC | Age: 35
End: 2025-08-06
Payer: COMMERCIAL

## 2025-08-07 ENCOUNTER — TELEPHONE (OUTPATIENT)
Dept: GASTROENTEROLOGY | Facility: CLINIC | Age: 35
End: 2025-08-07
Payer: COMMERCIAL

## 2025-08-18 ENCOUNTER — OFFICE VISIT (OUTPATIENT)
Dept: GASTROENTEROLOGY | Facility: CLINIC | Age: 35
End: 2025-08-18
Payer: COMMERCIAL

## 2025-08-18 DIAGNOSIS — F41.1 GENERALIZED ANXIETY DISORDER: Primary | ICD-10-CM

## 2025-08-18 DIAGNOSIS — F50.9 EATING DISORDER, UNSPECIFIED TYPE: ICD-10-CM

## 2025-08-18 PROCEDURE — 99999 PR PBB SHADOW E&M-EST. PATIENT-LVL I: CPT | Mod: PBBFAC,,, | Performed by: STUDENT IN AN ORGANIZED HEALTH CARE EDUCATION/TRAINING PROGRAM

## 2025-08-18 PROCEDURE — 90791 PSYCH DIAGNOSTIC EVALUATION: CPT | Mod: S$GLB,,, | Performed by: STUDENT IN AN ORGANIZED HEALTH CARE EDUCATION/TRAINING PROGRAM

## 2025-08-26 ENCOUNTER — OFFICE VISIT (OUTPATIENT)
Dept: GASTROENTEROLOGY | Facility: CLINIC | Age: 35
End: 2025-08-26
Payer: COMMERCIAL

## 2025-08-26 DIAGNOSIS — F50.9 EATING DISORDER, UNSPECIFIED TYPE: Primary | ICD-10-CM

## 2025-08-26 DIAGNOSIS — F41.1 GENERALIZED ANXIETY DISORDER: ICD-10-CM

## 2025-08-26 PROCEDURE — 90837 PSYTX W PT 60 MINUTES: CPT | Mod: 95,,, | Performed by: STUDENT IN AN ORGANIZED HEALTH CARE EDUCATION/TRAINING PROGRAM

## (undated) DEVICE — NDL INSUF ULTRA VERESS 120MM

## (undated) DEVICE — SOL 9P NACL IRR PIC IL

## (undated) DEVICE — CART STAPLE FLEX ETX 3.5MM BLU

## (undated) DEVICE — TROCAR ENDOPATH XCEL 12X100MM

## (undated) DEVICE — SUT VICRYL PLUS 4-0 P3 18IN

## (undated) DEVICE — GOWN SMART IMP BREATHABLE XXLG

## (undated) DEVICE — CANNULA ENDOPATH XCEL 5X100MM

## (undated) DEVICE — BANDAGE ADHESIVE

## (undated) DEVICE — SEE MEDLINE ITEM 156925

## (undated) DEVICE — SUT VICRYL+ 2-0 UR6 27 VIOL

## (undated) DEVICE — SOL CLEARIFY VISUALIZATION LAP

## (undated) DEVICE — GLOVE BIOGEL SKINSENSE PI 8.0

## (undated) DEVICE — IRRIGATOR ENDOSCOPY DISP.

## (undated) DEVICE — CLOSURE SKIN STERI STRIP 1/2X4

## (undated) DEVICE — ELECTRODE REM PLYHSV RETURN 9

## (undated) DEVICE — ADHESIVE DERMABOND ADVANCED

## (undated) DEVICE — BAG TISSUE RETRIEVAL 225ML

## (undated) DEVICE — STAPLER INT LINEAR ARTC 3.5-45

## (undated) DEVICE — SOL NS 1000CC

## (undated) DEVICE — CART STAPLE RELD 45MM WHT

## (undated) DEVICE — TROCAR ENDOPATH XCEL 5X100MM